# Patient Record
Sex: MALE | Race: WHITE | Employment: FULL TIME | URBAN - METROPOLITAN AREA
[De-identification: names, ages, dates, MRNs, and addresses within clinical notes are randomized per-mention and may not be internally consistent; named-entity substitution may affect disease eponyms.]

---

## 2021-12-20 ENCOUNTER — TELEPHONE (OUTPATIENT)
Dept: INTERNAL MEDICINE | Age: 41
End: 2021-12-20

## 2022-02-25 ENCOUNTER — TELEPHONE (OUTPATIENT)
Dept: INTERNAL MEDICINE | Age: 42
End: 2022-02-25

## 2022-04-28 ENCOUNTER — OFFICE VISIT (OUTPATIENT)
Dept: ORTHOPEDIC SURGERY | Age: 42
End: 2022-04-28
Payer: MEDICAID

## 2022-04-28 VITALS — WEIGHT: 274 LBS | HEIGHT: 75 IN | BODY MASS INDEX: 34.07 KG/M2

## 2022-04-28 DIAGNOSIS — G89.29 HIP PAIN, CHRONIC, RIGHT: ICD-10-CM

## 2022-04-28 DIAGNOSIS — E03.9 HYPOTHYROIDISM, UNSPECIFIED TYPE: ICD-10-CM

## 2022-04-28 DIAGNOSIS — M87.051 AVASCULAR NECROSIS OF BONE OF RIGHT HIP (HCC): Primary | ICD-10-CM

## 2022-04-28 DIAGNOSIS — F10.11 HISTORY OF ALCOHOL ABUSE: ICD-10-CM

## 2022-04-28 DIAGNOSIS — Z01.818 PREOP GENERAL PHYSICAL EXAM: ICD-10-CM

## 2022-04-28 DIAGNOSIS — M25.551 HIP PAIN, CHRONIC, RIGHT: ICD-10-CM

## 2022-04-28 PROCEDURE — 99204 OFFICE O/P NEW MOD 45 MIN: CPT | Performed by: ORTHOPAEDIC SURGERY

## 2022-04-28 RX ORDER — BACLOFEN 5 MG/1
10 TABLET ORAL 3 TIMES DAILY
COMMUNITY
Start: 2022-04-04

## 2022-04-28 RX ORDER — IBUPROFEN 800 MG/1
800 TABLET ORAL EVERY 8 HOURS PRN
COMMUNITY
Start: 2022-04-04 | End: 2022-07-16

## 2022-04-28 RX ORDER — DICLOFENAC SODIUM 75 MG/1
75 TABLET, DELAYED RELEASE ORAL 2 TIMES DAILY
Qty: 60 TABLET | Refills: 3 | Status: SHIPPED | OUTPATIENT
Start: 2022-04-28

## 2022-04-28 NOTE — PROGRESS NOTES
Chief Complaint:   Chief Complaint   Patient presents with    Hip Pain     Severe pain right hip / groin x 2 years. Rt sciatic pain. Pain with walking, feels right leg is shorter than left. No X-rays. Walks with cane       HPI 50-year-old man referred for consultation by Aria Meier [facility] for evaluation of chronic right groin pain. Symptoms for more than 2 years progressively disabling. States he has been treated by chiropractor with manipulation and concerned he may have had \"a groin tear\". He has been using 1 crutch. Told by a chiropractor that \"his hip is bone-on-bone\". Patient states that he recently checked himself in for alcohol rehab states he was a binge drinker and has been dry for 3 months. States he has had other no other known medical problems identified although there are no labs or other medical notes in our EHR. Patient Active Problem List   Diagnosis    Hypothyroid    History of alcohol abuse       Past Medical History:   Diagnosis Date    Thyroid disease        History reviewed. No pertinent surgical history. Current Outpatient Medications   Medication Sig Dispense Refill    Baclofen (LIORESAL) 5 MG tablet Take 5 mg by mouth 3 times daily       Cholecalciferol (VITAMIN D3) 125 MCG (5000 UT) CAPS Take 1 capsule by mouth daily       ibuprofen (ADVIL;MOTRIN) 800 MG tablet Take 800 mg by mouth every 8 hours as needed       diclofenac (VOLTAREN) 75 MG EC tablet Take 1 tablet by mouth 2 times daily 60 tablet 3    levothyroxine (SYNTHROID) 125 MCG tablet take 1 tablet by mouth once daily 30 tablet 5    naproxen (NAPROSYN) 500 MG tablet Take 1 tablet by mouth 2 times daily for 7 days. 14 tablet 0    cetirizine (ZYRTEC) 10 MG tablet Take 10 mg by mouth daily. (Patient not taking: Reported on 4/28/2022)       No current facility-administered medications for this visit.        No Known Allergies    Social History     Socioeconomic History    Marital status: Single     Spouse name: None    Number of children: None    Years of education: None    Highest education level: None   Occupational History    None   Tobacco Use    Smoking status: Never Smoker    Smokeless tobacco: Never Used   Substance and Sexual Activity    Alcohol use: Not Currently    Drug use: Never    Sexual activity: None   Other Topics Concern    None   Social History Narrative    ** Merged History Encounter **          Social Determinants of Health     Financial Resource Strain:     Difficulty of Paying Living Expenses: Not on file   Food Insecurity:     Worried About Running Out of Food in the Last Year: Not on file    Mirtha of Food in the Last Year: Not on file   Transportation Needs:     Lack of Transportation (Medical): Not on file    Lack of Transportation (Non-Medical): Not on file   Physical Activity:     Days of Exercise per Week: Not on file    Minutes of Exercise per Session: Not on file   Stress:     Feeling of Stress : Not on file   Social Connections:     Frequency of Communication with Friends and Family: Not on file    Frequency of Social Gatherings with Friends and Family: Not on file    Attends Tenri[employer] Services: Not on file    Active Member of [address] [place] [address] or Organizations: Not on file    Attends Club or Organization Meetings: Not on file    Marital Status: Not on file   Intimate Partner Violence:     Fear of Current or Ex-Partner: Not on file    Emotionally Abused: Not on file    Physically Abused: Not on file    Sexually Abused: Not on file   Housing Stability:     Unable to Pay for Housing in the Last Year: Not on file    Number of [place] in the Last Year: Not on file    Unstable Housing in the Last Year: Not on file       History reviewed. No pertinent family history. Review of Systems   No fever, chills, or other constitutionalsymptoms. No numbness or other neuro symptoms. No chest pain. No dyspnea.     [unfilled]   Ambulating with antalgic gait holding the right lower extremity flexed at the hip level. There is clinical shortening of the right lower extremity of nearly 1 inch although assessment limited by his hip flexion. There is severe restriction of right hip rotation in neutral which reproduces his pain. Distal motor exam right lower extremity appears intact. Physical Exam    Patient is alert and oriented. Affect appropriate. Well-developed well-nourished. Central obesity BMI 34. Pupils equal and reactive. Scleraeanicteric. Neck supple  Lungs clear. Cardiac rate and rhythm regular. Abdomen soft and nontender. Skin warm and dry. XRAY: X-ray today AP pelvis with AP and lateral views right hip. Right femoral head shows mixed sclerosis and lytic changes with collapse of the superior segment with secondary severe arthrosis and joint space loss. Left hip is well-preserved. Impression: Findings of osteonecrosis right femoral head with secondary severe arthropathy. Screening x-rays lumbar spine AP and lateral views today. There are some posterior element degenerative changes but disc space and vertebral heights well-preserved no spondylolisthesis. Impression: Mild lumbar spondylosis. ASSESSMENT/PLAN:    Yossi Chapman was seen today for hip pain. Diagnoses and all orders for this visit:    Avascular necrosis of bone of right hip ([facility] [address]  -     Ambulatory referral to Physical Therapy  -     Cane adjustable single point    Hip pain, chronic, right  -     XR HIP 2-3 VW W PELVIS RIGHT; Future  -     XR LUMBAR SPINE (2-3 VIEWS); Future  -     Graylin Epley, MD, Primary Care, [place]    History of alcohol abuse    Hypothyroidism, unspecified type    Preop general physical exam    Other orders  -     diclofenac (VOLTAREN) 75 MG EC tablet; Take 1 tablet by mouth 2 times daily    Findings and images reviewed with this patient. Clearly has history physical findings and x-ray findings of avascular necrosis right hip.   Treatment will require total hip arthroplasty. Patient wishes to try to work at his new job for at least a couple of months before he takes any time off for surgery. He also needs to properly establish with primary care for thorough medical evaluation and to be ready for follow-up when he is ready to have his surgery. He is referred to [facility] primary care for thorough medical evaluation. He would like to see PT to see what they can do to help him in the meantime. He has been taking ibuprofen without relief. May benefit from switching to a trial of Voltaren 75 mg twice daily with meals and GI precautions. He will follow-up after the above with Dr. Bard Ponce for further discussion of potential right total hip arthroplasty. Return in about 6 weeks (around 6/9/2022) for Dr. Bard Ponce, total hip arthroplasty.        Jessica Thomas MD    4/28/2022  12:49 PM

## 2022-05-04 ENCOUNTER — EVALUATION (OUTPATIENT)
Dept: PHYSICAL THERAPY | Age: 42
End: 2022-05-04
Payer: MEDICAID

## 2022-05-04 DIAGNOSIS — M87.051 IDIOPATHIC ASEPTIC NECROSIS OF RIGHT FEMUR (HCC): Primary | ICD-10-CM

## 2022-05-04 PROCEDURE — 97161 PT EVAL LOW COMPLEX 20 MIN: CPT | Performed by: PHYSICAL THERAPIST

## 2022-05-04 PROCEDURE — 97112 NEUROMUSCULAR REEDUCATION: CPT | Performed by: PHYSICAL THERAPIST

## 2022-05-04 PROCEDURE — 97110 THERAPEUTIC EXERCISES: CPT | Performed by: PHYSICAL THERAPIST

## 2022-05-05 NOTE — PROGRESS NOTES
[address] [facility]   Phone: [phone]   Fax: [phone]      Physical Therapy Daily Treatment Note    Date: 2022  Patient Name: Hyland Kussmaul  : 1980   MRN: [id number]  DOInjury: Chronic 2yrs   DOSx: NA  Referring Provider: Elina Fan MD  [address] [place] [address]     Medical Diagnosis:     M87.051 (ICD-10-CM) - Avascular necrosis of bone of right hip ([address]    Outcome Measure: LEFS 65% Disability    S: Pt reports that he has been referred to Dr. Veronika Romero for RLE LUTHER    O: Please refer to PT Eval  Time 9507-2583     Visit  Repeat outcome measure at mid point and end. Pain      Strength      Palpation      ROM      Modalities            Manual            Stretch      IT band supine      HS supine      Quad Prone      Exercise      Seated & supine ball squeeze hip add x20 each  TE   Quad sets x20  TE   glute bridge x10  NR   glute bridge with hip add x10 Ball squeeze  NR   glute bridge with hip abd x10 orange NR   LAQ x1 1min  NR   Seated & supine clamshell x20 orange NR                                       Resisted walk      FWD      BKWD      lat      March      Side stepping      Retro walk      Heel to toe      A:  Tolerated well. Above added to written HEP.     P: Continue with rehab plan    Stephanie Alarcon, PT OHPT    Treatment Charges: Mins Units   Initial Evaluation 20 1   Re-Evaluation     Ther Exercise         TE 10 1   Manual Therapy     MT     Ther Activities        TA     Gait Training          GT     Neuro Re-education NR 10 1   Modalities     Non-Billable Service Time     Other     Total Time/Units 40 3

## 2022-05-05 NOTE — PROGRESS NOTES
[address] [facility]   Phone: [phone]   Fax: [phone]         Date:  2022   Patient: Maynor Turpin  : 1980  MRN: [id number]  Referring Provider: Natan Lamb MD  [address] [place] [address]     Medical Diagnosis:     M87.051 (ICD-10-CM) - Avascular necrosis of bone of right hip ([facility] [address]     SUBJECTIVE:     Onset date: Chronic 2yrs     Onset: Insidious onset    Mechanism of Injury: Chronic right hip pain over 2yrs. Previous PT: none     Medical Management for Current Problem: referred to Dr. Rom Putnam for RLE [address]    Chief complaint: pain, decreased motion, decreased mobility, weakness, inability / limited ability to use leg, difficulty walking, difficulty with stairs    Behavior: condition is staying the same    Pain: constant  Current: 7/10     Best: 6/10     Worst:9/10    Symptom Type/Quality: sharp, throbbing, shooting, piercing  Location[de-identified] Hip: gross & diffuse right hip pain     Aggravated by: walking, standing, stairs, getting in/out of car, pivoting    Relieved by: nothing    Imaging results: XR LUMBAR SPINE (2-3 VIEWS)    Result Date: 2022  Radiology exam is complete. No Radiologist dictation. Please follow up with ordering provider. XR HIP 2-3 VW W PELVIS RIGHT    Result Date: 2022  Radiology exam is complete. No Radiologist dictation. Please follow up with ordering provider. Past Medical History:  Past Medical History:   Diagnosis Date    Thyroid disease      No past surgical history on file.     Medications:   Current Outpatient Medications   Medication Sig Dispense Refill    Baclofen (LIORESAL) 5 MG tablet Take 5 mg by mouth 3 times daily       Cholecalciferol (VITAMIN D3) 125 MCG (5000 UT) CAPS Take 1 capsule by mouth daily       ibuprofen (ADVIL;MOTRIN) 800 MG tablet Take 800 mg by mouth every 8 hours as needed       diclofenac (VOLTAREN) 75 MG EC tablet Take 1 tablet by mouth 2 times daily 60 tablet 3    levothyroxine (SYNTHROID) 125 MCG tablet take 1 tablet by mouth once daily 30 tablet 5    naproxen (NAPROSYN) 500 MG tablet Take 1 tablet by mouth 2 times daily for 7 days. 14 tablet 0    cetirizine (ZYRTEC) 10 MG tablet Take 10 mg by mouth daily. (Patient not taking: Reported on 4/28/2022)       No current facility-administered medications for this visit. 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 Exercise regimen: none    Patient Goals: improve strength & mobility prior to RLE LUTHER    Precautions/Contraindications: none    OBJECTIVE:     Observations: well nourished male    Inspection: demonstrates generalized pronated posture (forward head, protracted scapula, internally rotated shoulders, and flexed trunk and lower extremities)    Gait: antalgic gait, limp R LE, QC    Joint/Motion:    Hip:  Right:   AROM: severely limited      Muscle Length Testing: restricted flexibility hip flexors, IT Band, hamstrings, & quadriceps. Strength:    Hip:   Right: Hip: 3-/5, Knee: 4/5      Special test comments: NA due to pt's diagnosis       ASSESSMENT     Outcome Measure: Lower Extremity Functional Scale (LEFS) 65% impairment    Problems:    Pain reported 9/10   ROM decreased    Strength decreased    Decreased functional ability with walking, stairs, standing, use of right lower extremity    Reason for Skilled Care: The pt presents with severe right hip pain, gait deviations, functional limitations, limited rom, weakness, & restricted flexibility due to pt's diagnosis. Therefore, I recommend that the requires the skilled services of outpt PT Rehab to achieve LTGs, restore & resolve her deficits & limitations, & facilitate return to prior level of function/activity. [x] There are no barriers affecting plan of care or recovery     [] Barriers to this patient's plan of care or recovery include.     Domestic Concerns:  [x] No  [] Yes:    Short Term goals (3 weeks)   Decrease reported pain to 6/10   Increase ROM to 50%   Increase Strength to 4/5    Able to perform/complete the following functions/tasks: household AMB with minimal gait deviations   Lower Extremity Functional Scale (LEFS) 50% impairment    Long Term goals (6 weeks)   Decrease reported pain to 5/10   Increase ROM to 75%   Increase Strength to 4+/5    Able to perform/complete the following functions/tasks: community AMB with minimal gait deviations   LEFS 40% impairment    Independent with Home Exercise Programs    Rehab Potential: [x] Good  [] Fair  [] Poor    PLAN       Treatment Plan: 2x/wk x 6wks  [x] Therapeutic Exercise  [x] Therapeutic Activity  [x] Neuromuscular Re-education   [x] Gait Training  [x] Balance Training  [x] Aerobic conditioning  [] Manual Therapy  [] Massage/Fascial release   [] Work/Sport specific activities    [] Pain Neuroscience [x] Cold/hotpack  [] Vasocompression  [] Electrical Stimulation  [] Lumbar/Cervical Traction  [] Ultrasound   [] Iontophoresis: 4 mg/mL Dexamethasone Sodium Phosphate 40-80 mAmin  [] Dry Needling      [x] Instruction in HEP      []  Medication allergies reviewed for use of Dexamethasone Sodium Phosphate 4mg/ml  with iontophoresis treatments. Patient is not allergic. The following CPT codes are likely to be used in the care of this patient: 02354 PT Evaluation: Low Complexity   56572 Therapeutic Exercise   83695 Neuromuscular Re-Education   96392 Therapeutic Activities   55547 Gait Training       Suggested Professional Referral: [x] No  [] Yes:     Patient Education:  [x] Plans/Goals, Risks/Benefits discussed  [x] Home exercise program  Method of Education: [x] Verbal  [x] Demo  [x] Written  Comprehension of Education:  [x] Verbalizes understanding. [x] Demonstrates understanding. [] Needs Review. [] Demonstrates/verbalizes understanding of HEP/Ed previously given.     Frequency: 2 days per week for 6 weeks    Patient understands diagnosis/prognosis and consents to treatment, plan and goals: [x] Yes    [] No     Thank you for the opportunity to work with your patient. If you have questions or comments, please contact me at numbers listed above. Electronically signed by: Lloyd Reyes, [address]         Please sign Physician's Certification and return to: [address] [place] [id number]  Dept: [phone]  Dept Fax: [phone]  Loc: [phone] Certification / Comments     Frequency/Duration 2 days per week for 6 weeks. Certification period from 5/4/2022  to 7/4/2022. I have reviewed the Plan of Care established for skilled therapy services and certify that the services are required and that they will be provided while the patient is under my care.     Physician's Comments/Revisions:               Physician's Printed Name:                                           [de-identified] Signature:                                                               Date:

## 2022-06-06 ENCOUNTER — OFFICE VISIT (OUTPATIENT)
Dept: ORTHOPEDIC SURGERY | Age: 42
End: 2022-06-06

## 2022-06-06 VITALS — HEIGHT: 75 IN | WEIGHT: 271.4 LBS | BODY MASS INDEX: 33.74 KG/M2

## 2022-06-06 DIAGNOSIS — G89.29 HIP PAIN, CHRONIC, RIGHT: ICD-10-CM

## 2022-06-06 DIAGNOSIS — M25.551 HIP PAIN, CHRONIC, RIGHT: ICD-10-CM

## 2022-06-06 DIAGNOSIS — M87.051 AVASCULAR NECROSIS OF BONE OF RIGHT HIP (HCC): Primary | ICD-10-CM

## 2022-06-06 PROCEDURE — 99214 OFFICE O/P EST MOD 30 MIN: CPT | Performed by: ORTHOPAEDIC SURGERY

## 2022-06-06 RX ORDER — RESVER/WINE/BFL/GRPSD/PC/C/POM 200MG-60MG
CAPSULE ORAL
COMMUNITY
Start: 2022-05-26

## 2022-06-06 NOTE — PROGRESS NOTES
New Hip Patient     Referring Provider:   No referring provider defined for this encounter. CHIEF COMPLAINT:   Chief Complaint   Patient presents with    Hip Pain     RJB patient - Avascular necrosis of bone of right hip - last seen in office on 4/28/2022 - PT script was provided - Pt wants to try to work at his new job for at least a couple of months before he takes any time off for surgery - patient is using a cane to ambulate - laying down is painful / does not sleep well - getting up from seated position     Surgical Consult     Rt LUTHER        HPI:    Shanae Reece is a 36y.o. year old male who is seen today  for evaluation of right hip pain. He reports the pain has been ongoing for the past 2 years. He   does not recall a specific injury which started the pain. Patient was previously seen by Dr. Irwin Carter whom referred patient to our clinic for evaluation. Patient was told based on the significance of his imaging, a total hip arthroplasty was the only definitive solution for his pain. He reports the pain is worse with movement, better with nothing. The patient does have mechanical symptoms. The prior treatments have been oral anti-inflammatory medication. The patient is working. The patients occupation  at [employer]. He states the pain and limited mobility have significantly affected his daily activity to the point where he can no longer function satisfactorily. Patient walks with a cane. He had been seen by a chiropractor in the past and attempted therapy without relief. He also notes a significant leg length discrepancy. Patient also notes a prior alcoholism addiction for which he has successfully addressed. No known prior injury to the right hip. PAST MEDICAL HISTORY  Past Medical History:   Diagnosis Date    Thyroid disease        PAST SURGICAL HISTORY  History reviewed. No pertinent surgical history. FAMILY HISTORY   History reviewed. No pertinent family history.     SOCIAL HISTORY  Social History     Occupational History    Not on file   Tobacco Use    Smoking status: Never Smoker    Smokeless tobacco: Never Used   Substance and Sexual Activity    Alcohol use: Not Currently    Drug use: Never    Sexual activity: Not on file       CURRENT MEDICATIONS     Current Outpatient Medications:     D-5000 125 MCG (5000 UT) TABS tablet, TAKE 1 TABLET BY MOUTH EVERY DAY, Disp: , Rfl:     Baclofen (LIORESAL) 5 MG tablet, Take 5 mg by mouth 3 times daily , Disp: , Rfl:     ibuprofen (ADVIL;MOTRIN) 800 MG tablet, Take 800 mg by mouth every 8 hours as needed , Disp: , Rfl:     levothyroxine (SYNTHROID) 125 MCG tablet, take 1 tablet by mouth once daily, Disp: 30 tablet, Rfl: 5    diclofenac (VOLTAREN) 75 MG EC tablet, Take 1 tablet by mouth 2 times daily (Patient not taking: Reported on 6/6/2022), Disp: 60 tablet, Rfl: 3    cetirizine (ZYRTEC) 10 MG tablet, Take 10 mg by mouth daily. (Patient not taking: Reported on 4/28/2022), Disp: , Rfl:     ALLERGIES  No Known Allergies    Controlled Substances Monitoring:      REVIEW OF SYSTEMS:     Constitutional:  Negative for weight loss, fevers, chills, fatigue  Cardiovascular: Negative for chest pain, palpitations  Pulmonary: Negative for shortness of breath, labored breathing, cough  GI: negative for abdominal pain, nausea, vomitting   MSK: per HPI  Skin: negative for rash, open wounds    All other systems reviewed and are negative           PHYSICAL EXAM     Vitals:    06/06/22 1456   Weight: 271 lb 6.4 oz (123.1 kg)   Height: 6' 3\" (1.905 m)       Height: 6' 3\" (1.905 m)  Weight: [unfilled]  BMI:  Body mass index is 33.92 kg/m². General: The patient is alert and oriented x 3, appears to be stated age and in no distress. HEENT: head is normocephalic, atraumatic. EOMI. Neck: supple, trachea midline, no thyromegaly   Cardiovascular: peripheral pulses palpable.   Normal Capillary refill   Respiratory: breathing unlabored, chest expansion symmetric   Skin: no rash, no open wounds, no erythema  Psych: normal affect; mood stable  Neurologic: gait normal, sensation grossly intact in extremities  MSK:     Hip:  Gait exam is antalgic. The pelvis is asymmetic. In the supine position, there is pain with log roll. Hip range of motion is  120 degrees flexion, and 30 degrees abduction. With the hip flexed 90 degrees, internal rotation and external rotation is limited. FADIR (flexion, adduction, internal rotation) testing is positive. SOFI (flexion, abduction, external rotation) testing is negative. Patient does have significant shortening of the right leg in comparison to the left leg- approximately 4 cm LLD. IMAGING:    XR: AP pelvis, AP and Lateral of the involved hip obtained from 4/28/22 reviewed with patient today demonstrating osteo necrosis of the femoral head with collapse into the superolateral acetabulum and erosive changes present about the lateral rim of the acetabulum. ASSESSMENT  Avascular necrosis of the femoral head with severe bony erosion of the femoral head and acetabulum    PLAN  Patient was seen and examined in office today. We reviewed patient's history, exam, and imaging in detail. Patient aware that his right hip has significant bony erosion and loss of normal joint shape and function which would make any conservative treatment unlikely to be effective at this point. Patient voiced that his symptoms are severe enough that he would like to proceed with total hip arthroplasty at this time. We will plan on utilizing robotic assistance for the total hip arthroplasty procedure. We also discussed his leg length discrepancy and intentions of correcting to an extent that would not compromise motor function. We will have patient obtain CT right hip for surgical planning purposes and schedule patient for right total hip arthroplasty accordingly.     Ulises Sanchez DO  Orthopedic Surgery Resident  PGY-3    Staff Addendum    I have seen and evaluated the patient and agree with the assessment and plan as documented by the orthopaedic surgery resident. I have performed the key components of the history and physical examination and concur with the findings and plan, and have made changes where appropriate/necessary. Agree with above. He has impressive destruction of his right hip joint secondary to avascular necrosis. Any treatment aside from total hip replacement unlikely to provide any type of relief at this point given the extent of his disease. He is also about 4 cm shorter on the right side. He is interested in proceeding with surgery    We discussed the surgical procedure today which will involve Maximiliano robot assisted total hip replacement. Risks of surgery were discussed in detail including but not limited to infection, neurovascular injury, leg length discrepancy, dislocation, need for revision surgery, death from anesthesia, unforeseen risks. He understands and would like to proceed.   Surgical involve right hip Maximiliano robot assisted total hip replacement      Amber Hinojosa MD  [address]

## 2022-06-06 NOTE — PATIENT INSTRUCTIONS
Surgery: RIGHT HIP ANA ROBOTIC ASSISTED TOTAL JOINT ARTHROPLASTY  Date: 7/7/2022  Location: [facility] a call from the office once on the surgery schedule within the next 1-2 days. If you have any questions, please call [phone] and ask for Monica Jean-Baptiste.     -Schedule your CT scan - [phone]       -Read over your patient handout on what to expect about your upcoming surgery.     -You will receive a phone call from the hospital within week of your date of surgery regarding instructions and arrival time.

## 2022-06-06 NOTE — PROGRESS NOTES
New Hip Patient     Referring Provider:   Anna Marie Hollis Patient     CHIEF COMPLAINT:   Chief Complaint   Patient presents with    Hip Pain     [facility] patient - Avascular necrosis of bone of right hip - last seen in office on 4/28/2022 - PT script was provided - Pt wants to try to work at his new job for at least a couple of months before he takes any time off for surgery - patient is using a cane to ambulate - laying down is painful / does not sleep well - getting up from seated position     Surgical Consult     Rt LUTHER        HPI:    Armond Riedel is a 36y.o. year old male who is seen today  for evaluation of right hip pain. He reports the pain has been ongoing for the past {NUMBERS 1-10:43466} {days/wks/mos/yrs:253325}. He   {OBNM:42735} recall a specific injury which started the pain.  ***. He  reports the pain is worse with ***, better with ***. The patient {DOES/DOES NOT:68041} have mechanical symptoms. He denies a feeling of instability. The prior treatments have been {prev rx:815098}. The patient   {HAS/HAS NOT:881760668} responded to the treatment. The patient is working. The patients occupation . ***    PAST MEDICAL HISTORY  Past Medical History:   Diagnosis Date    Thyroid disease        PAST SURGICAL HISTORY  History reviewed. No pertinent surgical history. FAMILY HISTORY   History reviewed. No pertinent family history.     SOCIAL HISTORY  Social History     Occupational History    Not on file   Tobacco Use    Smoking status: Never Smoker    Smokeless tobacco: Never Used   Substance and Sexual Activity    Alcohol use: Not Currently    Drug use: Never    Sexual activity: Not on file       CURRENT MEDICATIONS     Current Outpatient Medications:     D-5000 125 MCG (5000 UT) TABS tablet, TAKE 1 TABLET BY MOUTH EVERY DAY, Disp: , Rfl:     Baclofen (LIORESAL) 5 MG tablet, Take 5 mg by mouth 3 times daily , Disp: , Rfl:     ibuprofen (ADVIL;MOTRIN) 800 MG tablet, Take 800 mg by mouth every 8 hours as needed , Disp: , Rfl:     levothyroxine (SYNTHROID) 125 MCG tablet, take 1 tablet by mouth once daily, Disp: 30 tablet, Rfl: 5    diclofenac (VOLTAREN) 75 MG EC tablet, Take 1 tablet by mouth 2 times daily (Patient not taking: Reported on 6/6/2022), Disp: 60 tablet, Rfl: 3    cetirizine (ZYRTEC) 10 MG tablet, Take 10 mg by mouth daily. (Patient not taking: Reported on 4/28/2022), Disp: , Rfl:     ALLERGIES  No Known Allergies    Controlled Substances Monitoring:      REVIEW OF SYSTEMS:     Constitutional:  Negative for weight loss, fevers, chills, fatigue  Cardiovascular: Negative for chest pain, palpitations  Pulmonary: Negative for shortness of breath, labored breathing, cough  GI: negative for abdominal pain, nausea, vomitting   MSK: per HPI  Skin: negative for rash, open wounds    All other systems reviewed and are negative           PHYSICAL EXAM     Vitals:    06/06/22 1456   Weight: 271 lb 6.4 oz (123.1 kg)   Height: 6' 3\" (1.905 m)       Height: 6' 3\" (1.905 m)  Weight: 271.4 lb ACTUAL  BMI:  Body mass index is 33.92 kg/m². General: The patient is alert and oriented x 3, appears to be stated age and in no distress. HEENT: head is normocephalic, atraumatic. EOMI. Neck: supple, trachea midline, no thyromegaly   Cardiovascular: peripheral pulses palpable.   Normal Capillary refill   Respiratory: breathing unlabored, chest expansion symmetric   Skin: no rash, no open wounds, no erythema  Psych: normal affect; mood stable  Neurologic: gait normal, sensation grossly intact in extremities  MSK:     Hip:  ***     IMAGING:    XR: AP pelvis, AP and Lateral of the involved hip display ***       ASSESSMENT  Right hip     PLAN  ***        Dariel Capps MD  Orthopaedic Surgery   6/6/22  3:00 PM

## 2022-06-07 ENCOUNTER — TELEPHONE (OUTPATIENT)
Dept: ORTHOPEDIC SURGERY | Age: 42
End: 2022-06-07

## 2022-06-07 NOTE — TELEPHONE ENCOUNTER
TOTAL JOINT ARTHROPLASTY  R LUTHER ANA ASSISTED      Reviewed medications with patient / holding the following medications for surgery: all OTC anti-inflammatories, vitamins and muscle relaxer. He does not require medical clearance. Patient educated on weight loss and diet. Current BMI: 33.92 Dietary Handout: Patient Education Guide Regarding Iron Replacement given to and reviewed with patient. Patient instructed to begin eating foods rich in Iron and Vitamin C one month pre op and continue for one month post op. Pre op instructions and total joint folder given to the patient. Patient informed: A hospital nurse from pre-admission testing will contact the patient with a date for testing and mandatory joint class. Patient expresses understanding and is in agreement with the plan. After review of the pre op information at home; patient will call office with any questions, concerns or problems. Patient also informed: Radiology from [facility] will contact regarding a date and time CT scan which is required for robot assisted surgery. Post Op Appointment: 7/15/2022       CT ordered 6/6, radiology number provided.     Case added to  for scheduling

## 2022-06-19 ASSESSMENT — PROMIS GLOBAL HEALTH SCALE
WHO IS THE PERSON COMPLETING THE PROMIS V1.1 SURVEY?: 0
IN GENERAL, PLEASE RATE HOW WELL YOU CARRY OUT YOUR USUAL SOCIAL ACTIVITIES (INCLUDES ACTIVITIES AT HOME, AT WORK, AND IN YOUR COMMUNITY, AND RESPONSIBILITIES AS A PARENT, CHILD, SPOUSE, EMPLOYEE, FRIEND, ETC) [ON A SCALE OF 1 (POOR) TO 5 (EXCELLENT)]?: 4
IN GENERAL, WOULD YOU SAY YOUR HEALTH IS...[ON A SCALE OF 1 (POOR) TO 5 (EXCELLENT)]: 3
IN GENERAL, WOULD YOU SAY YOUR QUALITY OF LIFE IS...[ON A SCALE OF 1 (POOR) TO 5 (EXCELLENT)]: 4
IN THE PAST 7 DAYS, HOW WOULD YOU RATE YOUR FATIGUE ON AVERAGE [ON A SCALE FROM 1 (NONE) TO 5 (VERY SEVERE)]?: 3
TO WHAT EXTENT ARE YOU ABLE TO CARRY OUT YOUR EVERYDAY PHYSICAL ACTIVITIES SUCH AS WALKING, CLIMBING STAIRS, CARRYING GROCERIES, OR MOVING A CHAIR [ON A SCALE OF 1 (NOT AT ALL) TO 5 (COMPLETELY)]?: 3
IN THE PAST 7 DAYS, HOW OFTEN HAVE YOU BEEN BOTHERED BY EMOTIONAL PROBLEMS, SUCH AS FEELING ANXIOUS, DEPRESSED, OR IRRITABLE [ON A SCALE FROM 1 (NEVER) TO 5 (ALWAYS)]?: 1
IN GENERAL, HOW WOULD YOU RATE YOUR MENTAL HEALTH, INCLUDING YOUR MOOD AND YOUR ABILITY TO THINK [ON A SCALE OF 1 (POOR) TO 5 (EXCELLENT)]?: 4
HOW IS THE PROMIS V1.1 BEING ADMINISTERED?: 2
SUM OF RESPONSES TO QUESTIONS 3, 6, 7, & 8: 13
SUM OF RESPONSES TO QUESTIONS 2, 4, 5, & 10: 13
IN GENERAL, HOW WOULD YOU RATE YOUR SATISFACTION WITH YOUR SOCIAL ACTIVITIES AND RELATIONSHIPS [ON A SCALE OF 1 (POOR) TO 5 (EXCELLENT)]?: 4
IN GENERAL, HOW WOULD YOU RATE YOUR PHYSICAL HEALTH [ON A SCALE OF 1 (POOR) TO 5 (EXCELLENT)]?: 2
IN THE PAST 7 DAYS, HOW WOULD YOU RATE YOUR PAIN ON AVERAGE [ON A SCALE FROM 0 (NO PAIN) TO 10 (WORST IMAGINABLE PAIN)]?: 5

## 2022-06-19 ASSESSMENT — HOOS JR
HOOS JR TOTAL INTERVAL SCORE: 32.735
HOOS JR RAW SCORE: 18
HOOS JR RAW SCORE: 18
BENDING TO THE FLOOR TO PICK UP OBJECT: 2
RISING FROM SITTING: 3
WALKING ON UNEVEN SURFACE: 4
GOING UP OR DOWN STAIRS: 3
SITTING: 3
LYING IN BED (TURNING OVER, MAINTAINING HIP POSITION): 3

## 2022-06-20 ENCOUNTER — TELEPHONE (OUTPATIENT)
Dept: ORTHOPEDIC SURGERY | Age: 42
End: 2022-06-20

## 2022-06-20 ENCOUNTER — HOSPITAL ENCOUNTER (OUTPATIENT)
Dept: CT IMAGING | Age: 42
Discharge: HOME | End: 2022-06-22
Payer: MEDICAID

## 2022-06-20 DIAGNOSIS — M87.051 AVASCULAR NECROSIS OF BONE OF RIGHT HIP (HCC): ICD-10-CM

## 2022-06-20 PROCEDURE — 73700 CT LOWER EXTREMITY W/O DYE: CPT

## 2022-06-23 ENCOUNTER — TELEPHONE (OUTPATIENT)
Dept: ORTHOPEDIC SURGERY | Age: 42
End: 2022-06-23

## 2022-06-23 NOTE — TELEPHONE ENCOUNTER
Patient called office, he states he has not been scheduled for his joint class / PAT. Instructed / informed we do not set up the classes.     Surgery :      Patient was provided with Fariba's phone number : [phone]     Patient will reach out to her for scheduling

## 2022-06-27 ENCOUNTER — HOSPITAL ENCOUNTER (OUTPATIENT)
Dept: PREADMISSION TESTING | Age: 42
Discharge: HOME | End: 2022-06-27
Payer: MEDICAID

## 2022-06-27 VITALS
TEMPERATURE: 98.3 F | HEART RATE: 76 BPM | WEIGHT: 282 LBS | OXYGEN SATURATION: 96 % | HEIGHT: 75 IN | DIASTOLIC BLOOD PRESSURE: 77 MMHG | SYSTOLIC BLOOD PRESSURE: 130 MMHG | RESPIRATION RATE: 16 BRPM | BODY MASS INDEX: 35.06 KG/M2

## 2022-06-27 LAB
ANION GAP SERPL CALCULATED.3IONS-SCNC: 14 MMOL/L (ref 7–16)
BUN BLDV-MCNC: 12 MG/DL (ref 6–20)
CALCIUM SERPL-MCNC: 10 MG/DL (ref 8.6–10.2)
CHLORIDE BLD-SCNC: 102 MMOL/L (ref 98–107)
CO2: 22 MMOL/L (ref 22–29)
CREAT SERPL-MCNC: 0.7 MG/DL (ref 0.7–1.2)
EKG ATRIAL RATE: 74 BPM
EKG P AXIS: 35 DEGREES
EKG P-R INTERVAL: 190 MS
EKG Q-T INTERVAL: 388 MS
EKG QRS DURATION: 92 MS
EKG QTC CALCULATION (BAZETT): 430 MS
EKG R AXIS: 16 DEGREES
EKG T AXIS: 21 DEGREES
EKG VENTRICULAR RATE: 74 BPM
GFR AFRICAN AMERICAN: >60
GFR NON-AFRICAN AMERICAN: >60 ML/MIN/1.73
GLUCOSE BLD-MCNC: 101 MG/DL (ref 74–99)
HCT VFR BLD CALC: 41.8 % (ref 37–54)
HEMOGLOBIN: 14.1 G/DL (ref 12.5–16.5)
MCH RBC QN AUTO: 30.8 PG (ref 26–35)
MCHC RBC AUTO-ENTMCNC: 33.7 % (ref 32–34.5)
MCV RBC AUTO: 91.3 FL (ref 80–99.9)
PDW BLD-RTO: 12.5 FL (ref 11.5–15)
PLATELET # BLD: 346 E9/L (ref 130–450)
PMV BLD AUTO: 8.8 FL (ref 7–12)
POTASSIUM SERPL-SCNC: 4.5 MMOL/L (ref 3.5–5)
RBC # BLD: 4.58 E12/L (ref 3.8–5.8)
SODIUM BLD-SCNC: 138 MMOL/L (ref 132–146)
WBC # BLD: 8.4 E9/L (ref 4.5–11.5)

## 2022-06-27 PROCEDURE — 93005 ELECTROCARDIOGRAM TRACING: CPT

## 2022-06-27 PROCEDURE — 80048 BASIC METABOLIC PNL TOTAL CA: CPT

## 2022-06-27 PROCEDURE — 85027 COMPLETE CBC AUTOMATED: CPT

## 2022-06-27 PROCEDURE — 87081 CULTURE SCREEN ONLY: CPT

## 2022-06-27 PROCEDURE — 36415 COLL VENOUS BLD VENIPUNCTURE: CPT

## 2022-06-27 RX ORDER — M-VIT,TX,IRON,MINS/CALC/FOLIC 27MG-0.4MG
1 TABLET ORAL DAILY
COMMUNITY

## 2022-06-27 ASSESSMENT — HOOS JR
GOING UP OR DOWN STAIRS: 3
LYING IN BED (TURNING OVER, MAINTAINING HIP POSITION): 3
RISING FROM SITTING: 3
HOOS JR RAW SCORE: 17
HOOS JR RAW SCORE: 17
BENDING TO THE FLOOR TO PICK UP OBJECT: 2
WALKING ON UNEVEN SURFACE: 3
HOOS JR TOTAL INTERVAL SCORE: 36.363
SITTING: 3

## 2022-06-27 NOTE — PROGRESS NOTES
I met with Linda Hahn this am for an orthopaedic education class. We discussed information regarding pre, intra, post-op, discharge, and LOS expectations. I provided ortho education materials. I encouraged the patient to call with any questions or concerns.

## 2022-06-27 NOTE — PROGRESS NOTES
[facility] PRE-ADMISSION TESTING INSTRUCTIONS    The Preadmission Testing patient is instructed accordingly using the following criteria (check applicable):    ARRIVAL INSTRUCTIONS:  [x] Parking the day of Surgery is located in the Main Entrance lot. Upon entering the door, make an immediate right to the surgery reception desk    [x] Bring photo ID and insurance card    [] Bring in a copy of Living will or Durable Power of  papers. [x] Please be sure to arrange for responsible adult to provide transportation to and from the hospital    [] Please arrange for responsible adult to be with you for the 24 hour period post procedure due to having anesthesia    [x] If you awake am of surgery not feeling well or have temperature >100 please call [phone]    GENERAL INSTRUCTIONS:    [x] Nothing by mouth after midnight, including gum, candy, mints or water    [x] You may brush your teeth, but do not swallow any water    [] Take medications as instructed with 1-2 oz of water    [x] Stop herbal supplements and vitamins 5 days prior to procedure    [] Follow preop dosing of blood thinners per physician instructions    [] Take 1/2 dose of evening insulin, but no insulin after midnight    [] No oral diabetic medications after midnight    [] If diabetic and have low blood sugar or feel symptomatic, take 1-2oz apple juice only    [] Bring inhalers day of surgery    [] Bring C-PAP/ Bi-Pap day of surgery    [] Bring urine specimen day of surgery    [] Shower or bath with soap, lather and rinse well, AM of Surgery, no lotion, powders or creams to surgical site    [] Follow bowel prep as instructed per surgeon    [x] No tobacco products within 24 hours of surgery     [x] No alcohol or illegal drug use within 24 hours of surgery.     [x] Jewelry, body piercing's, eyeglasses, contact lenses and dentures are not permitted into surgery (bring cases)      [x] Please do not wear any nail polish, make up or hair products on the day of surgery    [x] You can expect a call the business day prior to procedure to notify you if your arrival time changes    [x] If you receive a survey after surgery we would greatly appreciate your comments    [x] Please notify surgeon if you develop any illness between now and time of surgery (cold, cough, sore throat, fever, nausea, vomiting) or any signs of infections  including skin, wounds, and dental.    [x]  The Outpatient Pharmacy is available to fill your prescription here on your day of surgery, ask your preop nurse for details    [] Other instructions    EDUCATIONAL MATERIALS PROVIDED:    [x] PAT Preoperative Education Packet/Booklet     [x] Medication List    [] Transfusion bracelet applied with instructions    [x] Shower with soap, lather and rinse well, and use CHG wipes provided the evening before surgery as instructed    [x] Incentive spirometer with instructions

## 2022-06-29 LAB — MRSA CULTURE ONLY: NORMAL

## 2022-07-07 ENCOUNTER — HOSPITAL ENCOUNTER (OUTPATIENT)
Age: 42
Setting detail: OBSERVATION
Discharge: HOME | End: 2022-07-07
Attending: ORTHOPAEDIC SURGERY | Admitting: ORTHOPAEDIC SURGERY
Payer: MEDICAID

## 2022-07-07 ENCOUNTER — TELEPHONE (OUTPATIENT)
Dept: ORTHOPEDIC SURGERY | Age: 42
End: 2022-07-07

## 2022-07-07 VITALS
RESPIRATION RATE: 16 BRPM | WEIGHT: 282 LBS | DIASTOLIC BLOOD PRESSURE: 80 MMHG | TEMPERATURE: 98.6 F | BODY MASS INDEX: 35.06 KG/M2 | HEIGHT: 75 IN | OXYGEN SATURATION: 99 % | SYSTOLIC BLOOD PRESSURE: 138 MMHG | HEART RATE: 80 BPM

## 2022-07-07 DIAGNOSIS — Z01.818 PREOP GENERAL PHYSICAL EXAM: Primary | ICD-10-CM

## 2022-07-07 DIAGNOSIS — Z01.818 PRE-OP TESTING: Primary | ICD-10-CM

## 2022-07-07 PROBLEM — Z96.641 STATUS POST TOTAL HIP REPLACEMENT, RIGHT: Status: ACTIVE | Noted: 2022-07-07

## 2022-07-07 LAB — PREALBUMIN: 28 MG/DL (ref 20–40)

## 2022-07-07 PROCEDURE — 84134 ASSAY OF PREALBUMIN: CPT

## 2022-07-07 PROCEDURE — 36415 COLL VENOUS BLD VENIPUNCTURE: CPT

## 2022-07-07 RX ORDER — SODIUM CHLORIDE 0.9 % (FLUSH) 0.9 %
5-40 SYRINGE (ML) INJECTION PRN
Status: DISCONTINUED | OUTPATIENT
Start: 2022-07-07 | End: 2022-07-07 | Stop reason: HOSPADM

## 2022-07-07 RX ORDER — DEXAMETHASONE SODIUM PHOSPHATE 10 MG/ML
8 INJECTION, SOLUTION INTRAMUSCULAR; INTRAVENOUS ONCE
Status: DISCONTINUED | OUTPATIENT
Start: 2022-07-07 | End: 2022-07-07 | Stop reason: HOSPADM

## 2022-07-07 RX ORDER — CELECOXIB 100 MG/1
200 CAPSULE ORAL ONCE
Status: DISCONTINUED | OUTPATIENT
Start: 2022-07-07 | End: 2022-07-07 | Stop reason: HOSPADM

## 2022-07-07 RX ORDER — SODIUM CHLORIDE 0.9 % (FLUSH) 0.9 %
5-40 SYRINGE (ML) INJECTION EVERY 12 HOURS SCHEDULED
Status: DISCONTINUED | OUTPATIENT
Start: 2022-07-07 | End: 2022-07-07 | Stop reason: HOSPADM

## 2022-07-07 RX ORDER — ACETAMINOPHEN 500 MG
1000 TABLET ORAL ONCE
Status: DISCONTINUED | OUTPATIENT
Start: 2022-07-07 | End: 2022-07-07 | Stop reason: HOSPADM

## 2022-07-07 RX ORDER — SODIUM CHLORIDE 9 MG/ML
INJECTION, SOLUTION INTRAVENOUS PRN
Status: DISCONTINUED | OUTPATIENT
Start: 2022-07-07 | End: 2022-07-07 | Stop reason: HOSPADM

## 2022-07-07 ASSESSMENT — PAIN - FUNCTIONAL ASSESSMENT: PAIN_FUNCTIONAL_ASSESSMENT: 0-10

## 2022-07-07 ASSESSMENT — PAIN DESCRIPTION - DESCRIPTORS: DESCRIPTORS: SHARP;THROBBING

## 2022-07-07 NOTE — PROGRESS NOTES
Dr Dorota Santana notified pt has not taken his synthroid in over 1 month. Pt states he was sent the wrong medication and has not been able to get in contact with his doctor, states doctor \"is hard to get a hold of\". Dr Dorota Santana in to bedside to explain the risks of surgery under this circumstance. IV removed, pt to get dressed, requested to see Dr Marlon Oliver before leaving. Notified Clyde YIN, pt would like to speak to doctor.

## 2022-07-07 NOTE — PROGRESS NOTES
Orthopedic surgery    Mr. Ct Hamilton has not taken his Synthroid medication for over 1 month. He is being canceled today per the anesthesiologist due to risk of complications secondary to being off this medication. He was instructed to contact his primary care physician. A new referral was placed today for a primary care physician. He will have to be back on his Synthroid and cleared from medical and anesthesiology standpoints, at which time we will reschedule to proceed with right total hip arthroplasty. I discussed this in detail with the patient today.     Allan Hinkle MD  [address] [place] [address]

## 2022-07-07 NOTE — H&P
Take by mouth  D-5000 125 MCG (5000 UT) TABS tablet, TAKE 1 TABLET BY MOUTH EVERY DAY  Baclofen (LIORESAL) 5 MG tablet, Take 5 mg by mouth 3 times daily   ibuprofen (ADVIL;MOTRIN) 800 MG tablet, Take 800 mg by mouth every 8 hours as needed   diclofenac (VOLTAREN) 75 MG EC tablet, Take 1 tablet by mouth 2 times daily  levothyroxine (SYNTHROID) 125 MCG tablet, take 1 tablet by mouth once daily    Allergies:  Patient has no known allergies. History of allergic reaction to anesthesia:  No    Social History:   TOBACCO:   reports that he has never smoked. He has never used smokeless tobacco.  ETOH:   reports previous alcohol use. DRUGS:   reports no history of drug use. Family History:   No family history on file. REVIEW OF SYSTEMS:  CONSTITUTIONAL:  negative  RESPIRATORY:  negative  CARDIOVASCULAR:  negative  MUSCULOSKELETAL:  negative except for  HPI  NEUROLOGICAL:  negative    PHYSICAL EXAM:     VITALS:  There were no vitals taken for this visit. Gen: AOx3, NAD    Heart:  normal apical pulses, normal S1 and S2 and no edema    Lungs:  No increased work of breathing, good air exchange     Abdomen:  no scars, non-distended and non-tender    Extremities:  No clubbing, cyanosis, or edema    Musculoskeletal:      Gait exam is antalgic. The pelvis is asymmetic. In the supine position, there is pain with log roll. Hip range of motion is 120 degrees flexion, and 30 degrees abduction. With the hip flexed 90 degrees, internal rotation and external rotation is limited. FADIR (flexion, adduction, internal rotation) testing is positive.   SOFI (flexion, abduction, external rotation) testing is negative.      Patient does have significant shortening of the right leg in comparison to the left leg- approximately 4 cm LLD.          DATA:  CBC:   Lab Results   Component Value Date/Time    WBC 8.4 06/27/2022 12:13 PM    RBC 4.58 06/27/2022 12:13 PM    HGB 14.1 06/27/2022 12:13 PM    HCT 41.8 06/27/2022 12:13 PM    MCV 91.3 06/27/2022 12:13 PM    MCH 30.8 06/27/2022 12:13 PM    MCHC 33.7 06/27/2022 12:13 PM    RDW 12.5 06/27/2022 12:13 PM     06/27/2022 12:13 PM    MPV 8.8 06/27/2022 12:13 PM     BMP:    Lab Results   Component Value Date/Time     06/27/2022 12:13 PM    K 4.5 06/27/2022 12:13 PM     06/27/2022 12:13 PM    CO2 22 06/27/2022 12:13 PM    BUN 12 06/27/2022 12:13 PM    LABALBU 3.9 12/24/2014 01:20 PM    CREATININE 0.7 06/27/2022 12:13 PM    CALCIUM 10.0 06/27/2022 12:13 PM    GFRAA >60 06/27/2022 12:13 PM    LABGLOM >60 06/27/2022 12:13 PM    GLUCOSE 101 06/27/2022 12:13 PM       Radiology Review: X-ray right hip and pelvis from 4/28/2020 reviewed demonstrating osteonecrosis of the femoral head with collapse into the superior lateral acetabulum and erosive changes present to the lateral rim of the acetabulum    ASSESSMENT AND PLAN:    1. Patient is a 36 y.o. male with above specified procedure planned right Maximiliano robotic assisted total hip arthroplasty with anesthesia    2. Procedure options, risks and benefits reviewed with patient. Patient expresses understanding and has signed consent form to proceed. 3.  Patient and family were provided with pre-op and post-op instructions, prescription medications, and any other supplied needed post op (slings, braces, etc.)    Controlled substances monitoring: possible medication side effects, risk of tolerance and/or dependence, and alternative treatments discussed, no signs of potential drug abuse or diversion identified and OARRS report reviewed today- activity consistent with treatment plan.       ASHTYN Ventura-CNP  Orthopaedic Surgery   7/7/22  6:29 AM

## 2022-07-07 NOTE — TELEPHONE ENCOUNTER
Patient called office requesting to be placed back on surgery schedule asap. Patient was set to undergo Rt hip ANA Robotic Assisted Total Joint Arthroplasty today 7/7/2022. Patient stopped his synthroid medication without telling provider or hospital / PAT / anesthesia - ultimately he was canceled for today. Patient states that he reached out to his PCP, they filled medication and will begin taking again his synthroid medication. He would like to be reschedule ASAP and does not want to wait any longer. Instructed he would have to be on medication for a couple weeks, Dr Ayah Lovett is off the last week of July and his schedule is already filling up in August but will discuss date he would like to proceed with surgery tomorrow when he is back in office. Patient expressed understanding.     I also provided patient with referral information for establishing new PCP, Dr Clara Sterling

## 2022-07-08 NOTE — TELEPHONE ENCOUNTER
Patient was contacted and instructed he must be on medication for 2-3 weeks prior to getting back onto the schedule. We need documentation that he has seen a PCP / family physician noting he is taking medication and cleared for surgery. Patient expressed understanding.         He states below is the provider who refilled medication 7/7/2022   Hudson Valdes PA-C     PCP - General, Physician Assistant

## 2022-07-16 ENCOUNTER — OFFICE VISIT (OUTPATIENT)
Dept: PRIMARY CARE CLINIC | Age: 42
End: 2022-07-16
Payer: MEDICAID

## 2022-07-16 VITALS
OXYGEN SATURATION: 100 % | RESPIRATION RATE: 18 BRPM | WEIGHT: 278 LBS | BODY MASS INDEX: 34.57 KG/M2 | TEMPERATURE: 97.5 F | DIASTOLIC BLOOD PRESSURE: 80 MMHG | HEIGHT: 75 IN | SYSTOLIC BLOOD PRESSURE: 122 MMHG | HEART RATE: 93 BPM

## 2022-07-16 DIAGNOSIS — Z11.59 NEED FOR HEPATITIS C SCREENING TEST: ICD-10-CM

## 2022-07-16 DIAGNOSIS — F10.11 HISTORY OF ALCOHOL ABUSE: ICD-10-CM

## 2022-07-16 DIAGNOSIS — Z11.4 ENCOUNTER FOR SCREENING FOR HIV: ICD-10-CM

## 2022-07-16 DIAGNOSIS — Z00.00 ANNUAL PHYSICAL EXAM: ICD-10-CM

## 2022-07-16 DIAGNOSIS — E03.9 HYPOTHYROIDISM, UNSPECIFIED TYPE: ICD-10-CM

## 2022-07-16 DIAGNOSIS — Z00.00 ANNUAL PHYSICAL EXAM: Primary | ICD-10-CM

## 2022-07-16 LAB
CHOLESTEROL, TOTAL: 192 MG/DL (ref 0–199)
HBA1C MFR BLD: 5.6 %
HDLC SERPL-MCNC: 35 MG/DL
LDL CHOLESTEROL CALCULATED: 110 MG/DL (ref 0–99)
TRIGL SERPL-MCNC: 237 MG/DL (ref 0–149)
VLDLC SERPL CALC-MCNC: 47 MG/DL

## 2022-07-16 PROCEDURE — 83036 HEMOGLOBIN GLYCOSYLATED A1C: CPT | Performed by: STUDENT IN AN ORGANIZED HEALTH CARE EDUCATION/TRAINING PROGRAM

## 2022-07-16 PROCEDURE — 99386 PREV VISIT NEW AGE 40-64: CPT | Performed by: STUDENT IN AN ORGANIZED HEALTH CARE EDUCATION/TRAINING PROGRAM

## 2022-07-16 ASSESSMENT — PATIENT HEALTH QUESTIONNAIRE - PHQ9
SUM OF ALL RESPONSES TO PHQ QUESTIONS 1-9: 0
SUM OF ALL RESPONSES TO PHQ QUESTIONS 1-9: 0
SUM OF ALL RESPONSES TO PHQ9 QUESTIONS 1 & 2: 0
1. LITTLE INTEREST OR PLEASURE IN DOING THINGS: 0
2. FEELING DOWN, DEPRESSED OR HOPELESS: 0
SUM OF ALL RESPONSES TO PHQ QUESTIONS 1-9: 0
SUM OF ALL RESPONSES TO PHQ QUESTIONS 1-9: 0

## 2022-07-16 NOTE — PROGRESS NOTES
[facility]  Department of Family Medicine      Patient:  Patel Donaldson 36 y.o. male     Date of Service: 7/16/22      Chief complaint:   Chief Complaint   Patient presents with    Establish Care         History ofPresent Illness   The patient is a 36 y.o. male  presented to the clinic with complaints as above. Hypothyroidism  -following with endo, restarted synthroid    Recovering alcoholic  -has been sober for 6 months now  -going to [employer], has a sponsor, chetna walker     Otherwise, has been doing ok  Denies any chest pain, SOB, nausea, vomiting  -moving bowels and urinating ok     Past Medical History:      Diagnosis Date    Avascular necrosis ([address]     Recovering alcoholic ([facility] [address]     Thyroid disease        PastSurgical History:        Procedure Laterality Date    WISDOM TOOTH EXTRACTION         Allergies:    Patient has no known allergies.     Social History:   Social History     Socioeconomic History    Marital status: Single     Spouse name: Not on file    Number of children: Not on file    Years of education: Not on file    Highest education level: Not on file   Occupational History    Not on file   Tobacco Use    Smoking status: Never    Smokeless tobacco: Never   Vaping Use    Vaping Use: Never used   Substance and Sexual Activity    Alcohol use: Not Currently     Comment: sober 5 months as of 07/01/2022    Drug use: Never    Sexual activity: Not on file   Other Topics Concern    Not on file   Social History Narrative    ** Merged History Encounter **          Social Determinants of Health     Financial Resource Strain: Not on file   Food Insecurity: Not on file   Transportation Needs: Not on file   Physical Activity: Not on file   Stress: Not on file   Social Connections: Not on file   Intimate Partner Violence: Not on file   Housing Stability: Not on file        Family History:       Problem Relation Age of Onset    Other Mother         elba, MS    Cancer Mother skin and bone cancer    Cancer Father         blood and bone cancer,  due to this       Review of Systems:   Review of Systems - as above     Physical Exam   Vitals: /80   Pulse 93   Temp 97.5 °F (36.4 °C) (Infrared)   Resp 18   Ht 6' 3\" (1.905 m)   Wt 278 lb (126.1 kg)   SpO2 100%   BMI 34.75 kg/m²   Physical Exam  Constitutional:       Appearance: He is well-developed. HENT:      Head: Normocephalic and atraumatic. Eyes:      General:         Right eye: No discharge. Left eye: No discharge. Conjunctiva/sclera: Conjunctivae normal.   Neck:      Trachea: No tracheal deviation. Cardiovascular:      Rate and Rhythm: Normal rate and regular rhythm. Heart sounds: Normal heart sounds. Pulmonary:      Effort: Pulmonary effort is normal. No respiratory distress. Breath sounds: Normal breath sounds. No wheezing. Abdominal:      General: Bowel sounds are normal. There is no distension. Palpations: Abdomen is soft. Tenderness: There is no abdominal tenderness. Musculoskeletal:         General: Tenderness present. Cervical back: Normal range of motion and neck supple. Skin:     General: Skin is warm and dry. Neurological:      Mental Status: He is alert. Psychiatric:         Behavior: Behavior normal.           Assessment and Plan       1. Annual physical exam  Needs lab work and screening as below  -Has surgery scheduled next week, low risk candidate  -A1c in office was 5.6, which is normal however high normal, advised patient to start watching diet and exercising after surgery   -See back after surgery to ensure he is improving  - POCT glycosylated hemoglobin (Hb A1C)  - LIPID PANEL; Future  - Hepatitis C Antibody; Future  - HIV Screen; Future    2. Hypothyroidism, unspecified type  F/u  -Following with specialist, on his usual dosage, has repeat TSH 6 weeks    3.  History of alcohol abuse  F/u  -Has been sober for 6 months now, encouraged to continue, has appropriate counseling and is with AA    4. Encounter for screening for HIV  HCM:  - HIV Screen; Future    5. Need for hepatitis C screening test  HCM:   - Hepatitis C Antibody; Future    Counseled regarding above diagnosis, including possible risks and complications,  especially if left uncontrolled. Counseled regarding the possible side effects, risks, benefits and alternatives to treatment;patient and/or guardian verbalizes understanding, agrees, feels comfortable with and wishes to proceed with above treatment plan. Call or go to [address] if symptoms worsen or persist. Advised patient to call with any new medication issues, and, as applicable, read all Rx info from pharmacy to assure aware of all possible risks and side effects of medicationbefore taking. Patient and/or guardian given opportunity to ask questions/raise concerns. The patient verbalized comfort and understanding ofinstructions. I encourage further reading and education about your health conditions. Information on many health conditions is provided by [facility] [employer]: [email or web address]  Please bring any questions to me at your nextvisit. Return to Office: Return for see back after surgery . Medication List:    Current Outpatient Medications   Medication Sig Dispense Refill    Multiple Vitamins-Minerals (THERAPEUTIC MULTIVITAMIN-MINERALS) tablet Take 1 tablet by mouth daily      VITAMIN A PO Take by mouth      D-5000 125 MCG (5000 UT) TABS tablet TAKE 1 TABLET BY MOUTH EVERY DAY      Baclofen (LIORESAL) 5 MG tablet Take 5 mg by mouth 3 times daily       diclofenac (VOLTAREN) 75 MG EC tablet Take 1 tablet by mouth 2 times daily 60 tablet 3    levothyroxine (SYNTHROID) 125 MCG tablet take 1 tablet by mouth once daily 30 tablet 5     No current facility-administered medications for this visit.         Royal Neil, DO       This document may have been prepared at least partially through the use of voice recognition software. Although effort is taken to assure the accuracy ofthis document, it is possible that grammatical, syntax,  or spelling errors may occur.

## 2022-07-18 ASSESSMENT — PROMIS GLOBAL HEALTH SCALE
TO WHAT EXTENT ARE YOU ABLE TO CARRY OUT YOUR EVERYDAY PHYSICAL ACTIVITIES SUCH AS WALKING, CLIMBING STAIRS, CARRYING GROCERIES, OR MOVING A CHAIR [ON A SCALE OF 1 (NOT AT ALL) TO 5 (COMPLETELY)]?: 3
IN GENERAL, PLEASE RATE HOW WELL YOU CARRY OUT YOUR USUAL SOCIAL ACTIVITIES (INCLUDES ACTIVITIES AT HOME, AT WORK, AND IN YOUR COMMUNITY, AND RESPONSIBILITIES AS A PARENT, CHILD, SPOUSE, EMPLOYEE, FRIEND, ETC) [ON A SCALE OF 1 (POOR) TO 5 (EXCELLENT)]?: 3
SUM OF RESPONSES TO QUESTIONS 3, 6, 7, & 8: 19
IN THE PAST 7 DAYS, HOW OFTEN HAVE YOU BEEN BOTHERED BY EMOTIONAL PROBLEMS, SUCH AS FEELING ANXIOUS, DEPRESSED, OR IRRITABLE [ON A SCALE FROM 1 (NEVER) TO 5 (ALWAYS)]?: 1
SUM OF RESPONSES TO QUESTIONS 2, 4, 5, & 10: 14
IN GENERAL, WOULD YOU SAY YOUR HEALTH IS...[ON A SCALE OF 1 (POOR) TO 5 (EXCELLENT)]: 3
IN GENERAL, HOW WOULD YOU RATE YOUR SATISFACTION WITH YOUR SOCIAL ACTIVITIES AND RELATIONSHIPS [ON A SCALE OF 1 (POOR) TO 5 (EXCELLENT)]?: 4
IN GENERAL, WOULD YOU SAY YOUR QUALITY OF LIFE IS...[ON A SCALE OF 1 (POOR) TO 5 (EXCELLENT)]: 4
HOW IS THE PROMIS V1.1 BEING ADMINISTERED?: 2
WHO IS THE PERSON COMPLETING THE PROMIS V1.1 SURVEY?: 0
IN GENERAL, HOW WOULD YOU RATE YOUR MENTAL HEALTH, INCLUDING YOUR MOOD AND YOUR ABILITY TO THINK [ON A SCALE OF 1 (POOR) TO 5 (EXCELLENT)]?: 5
IN THE PAST 7 DAYS, HOW WOULD YOU RATE YOUR PAIN ON AVERAGE [ON A SCALE FROM 0 (NO PAIN) TO 10 (WORST IMAGINABLE PAIN)]?: 9
IN GENERAL, HOW WOULD YOU RATE YOUR PHYSICAL HEALTH [ON A SCALE OF 1 (POOR) TO 5 (EXCELLENT)]?: 2
IN THE PAST 7 DAYS, HOW WOULD YOU RATE YOUR FATIGUE ON AVERAGE [ON A SCALE FROM 1 (NONE) TO 5 (VERY SEVERE)]?: 5

## 2022-07-18 ASSESSMENT — HOOS JR
BENDING TO THE FLOOR TO PICK UP OBJECT: 4
WALKING ON UNEVEN SURFACE: 4
GOING UP OR DOWN STAIRS: 3
HOOS JR RAW SCORE: 22
RISING FROM SITTING: 4
LYING IN BED (TURNING OVER, MAINTAINING HIP POSITION): 4
SITTING: 3
HOOS JR TOTAL INTERVAL SCORE: 15.633
HOOS JR RAW SCORE: 22

## 2022-07-19 ENCOUNTER — HOSPITAL ENCOUNTER (OUTPATIENT)
Dept: PREADMISSION TESTING | Age: 42
Discharge: HOME | End: 2022-07-19
Payer: MEDICAID

## 2022-07-19 VITALS
BODY MASS INDEX: 34.57 KG/M2 | HEIGHT: 75 IN | OXYGEN SATURATION: 96 % | RESPIRATION RATE: 20 BRPM | SYSTOLIC BLOOD PRESSURE: 133 MMHG | TEMPERATURE: 98.6 F | DIASTOLIC BLOOD PRESSURE: 74 MMHG | WEIGHT: 278 LBS | HEART RATE: 85 BPM

## 2022-07-19 LAB
ANION GAP SERPL CALCULATED.3IONS-SCNC: 12 MMOL/L (ref 7–16)
BASOPHILS ABSOLUTE: 0.05 E9/L (ref 0–0.2)
BASOPHILS RELATIVE PERCENT: 0.6 % (ref 0–2)
BUN BLDV-MCNC: 7 MG/DL (ref 6–20)
CALCIUM SERPL-MCNC: 9.9 MG/DL (ref 8.6–10.2)
CHLORIDE BLD-SCNC: 102 MMOL/L (ref 98–107)
CO2: 22 MMOL/L (ref 22–29)
CREAT SERPL-MCNC: 0.7 MG/DL (ref 0.7–1.2)
EOSINOPHILS ABSOLUTE: 0.53 E9/L (ref 0.05–0.5)
EOSINOPHILS RELATIVE PERCENT: 6.3 % (ref 0–6)
GFR AFRICAN AMERICAN: >60
GFR NON-AFRICAN AMERICAN: >60 ML/MIN/1.73
GLUCOSE BLD-MCNC: 107 MG/DL (ref 74–99)
HCT VFR BLD CALC: 38.1 % (ref 37–54)
HEMOGLOBIN: 13.2 G/DL (ref 12.5–16.5)
HEPATITIS C ANTIBODY INTERPRETATION: NORMAL
HIV-1 AND HIV-2 ANTIBODIES: NORMAL
IMMATURE GRANULOCYTES #: 0.03 E9/L
IMMATURE GRANULOCYTES %: 0.4 % (ref 0–5)
LYMPHOCYTES ABSOLUTE: 1.34 E9/L (ref 1.5–4)
LYMPHOCYTES RELATIVE PERCENT: 15.9 % (ref 20–42)
MCH RBC QN AUTO: 30.8 PG (ref 26–35)
MCHC RBC AUTO-ENTMCNC: 34.6 % (ref 32–34.5)
MCV RBC AUTO: 89 FL (ref 80–99.9)
MONOCYTES ABSOLUTE: 0.82 E9/L (ref 0.1–0.95)
MONOCYTES RELATIVE PERCENT: 9.7 % (ref 2–12)
NEUTROPHILS ABSOLUTE: 5.67 E9/L (ref 1.8–7.3)
NEUTROPHILS RELATIVE PERCENT: 67.1 % (ref 43–80)
PDW BLD-RTO: 12.5 FL (ref 11.5–15)
PLATELET # BLD: 350 E9/L (ref 130–450)
PMV BLD AUTO: 8.8 FL (ref 7–12)
POTASSIUM SERPL-SCNC: 4.1 MMOL/L (ref 3.5–5)
RBC # BLD: 4.28 E12/L (ref 3.8–5.8)
SODIUM BLD-SCNC: 136 MMOL/L (ref 132–146)
WBC # BLD: 8.4 E9/L (ref 4.5–11.5)

## 2022-07-19 PROCEDURE — 85025 COMPLETE CBC W/AUTO DIFF WBC: CPT

## 2022-07-19 PROCEDURE — 36415 COLL VENOUS BLD VENIPUNCTURE: CPT

## 2022-07-19 PROCEDURE — 80048 BASIC METABOLIC PNL TOTAL CA: CPT

## 2022-07-19 ASSESSMENT — PAIN DESCRIPTION - PAIN TYPE: TYPE: CHRONIC PAIN

## 2022-07-19 ASSESSMENT — HOOS JR
RISING FROM SITTING: 4
HOOS JR TOTAL INTERVAL SCORE: 20.805
HOOS JR RAW SCORE: 21
SITTING: 3
GOING UP OR DOWN STAIRS: 4
HOOS JR RAW SCORE: 21
LYING IN BED (TURNING OVER, MAINTAINING HIP POSITION): 3
BENDING TO THE FLOOR TO PICK UP OBJECT: 3
WALKING ON UNEVEN SURFACE: 4

## 2022-07-19 ASSESSMENT — PAIN - FUNCTIONAL ASSESSMENT: PAIN_FUNCTIONAL_ASSESSMENT: PREVENTS OR INTERFERES SOME ACTIVE ACTIVITIES AND ADLS

## 2022-07-19 ASSESSMENT — PAIN DESCRIPTION - LOCATION: LOCATION: HIP

## 2022-07-19 ASSESSMENT — PAIN DESCRIPTION - ONSET: ONSET: ON-GOING

## 2022-07-19 ASSESSMENT — PAIN DESCRIPTION - ORIENTATION: ORIENTATION: RIGHT

## 2022-07-19 ASSESSMENT — PAIN SCALES - GENERAL: PAINLEVEL_OUTOF10: 10

## 2022-07-19 NOTE — PROGRESS NOTES
[facility] PRE-ADMISSION TESTING INSTRUCTIONS    The Preadmission Testing patient is instructed accordingly using the following criteria (check applicable):    ARRIVAL INSTRUCTIONS:  [x] Parking the day of Surgery is located in the Main Entrance lot. Upon entering the door, make an immediate right to the surgery reception desk    [x] Bring photo ID and insurance card    [] Bring in a copy of Living will or Durable Power of  papers. [x] Please be sure to arrange for responsible adult to provide transportation to and from the hospital    [x] Please arrange for responsible adult to be with you for the 24 hour period post procedure due to having anesthesia    [x] If you awake am of surgery not feeling well or have temperature >100 please call [phone]    GENERAL INSTRUCTIONS:    [x] Nothing by mouth after midnight, including gum, candy, mints or water    [x] You may brush your teeth, but do not swallow any water    [] Take medications as instructed with 1-2 oz of water    [x] Stop herbal supplements and vitamins 5 days prior to procedure    [] Follow preop dosing of blood thinners per physician instructions    [] Take 1/2 dose of evening insulin, but no insulin after midnight    [] No oral diabetic medications after midnight    [] If diabetic and have low blood sugar or feel symptomatic, take 1-2oz apple juice only    [] Bring inhalers day of surgery    [] Bring C-PAP/ Bi-Pap day of surgery    [] Bring urine specimen day of surgery    [] Shower or bath with soap, lather and rinse well, AM of Surgery, no lotion, powders or creams to surgical site    [] Follow bowel prep as instructed per surgeon    [x] No tobacco products within 24 hours of surgery     [x] No alcohol or illegal drug use within 24 hours of surgery.     [x] Jewelry, body piercing's, eyeglasses, contact lenses and dentures are not permitted into surgery (bring cases)      [] Please do not wear any nail polish, make up or hair products on the day of surgery    [x] You can expect a call the business day prior to procedure to notify you if your arrival time changes    [x] If you receive a survey after surgery we would greatly appreciate your comments    [] Parent/guardian of a minor must accompany their child and remain on the premises  the entire time they are under our care     [] Pediatric patients may bring favorite toy, blanket or comfort item with them    [] A caregiver or family member must remain with the patient during their stay if they are mentally handicapped, have dementia, disoriented or unable to use a call light or would be a safety concern if left unattended    [x] Please notify surgeon if you develop any illness between now and time of surgery (cold, cough, sore throat, fever, nausea, vomiting) or any signs of infections  including skin, wounds, and dental.    [x]  The Outpatient Pharmacy is available to fill your prescription here on your day of surgery, ask your preop nurse for details    [x] Other instructions: wear loose, comfortable clothing    EDUCATIONAL MATERIALS PROVIDED:    [x] PAT Preoperative Education Packet/Booklet     [x] Medication List    [] Transfusion bracelet applied with instructions    [x] Shower with soap, lather and rinse well, and use CHG wipes provided the evening before surgery as instructed    [x] Incentive spirometer with instructions

## 2022-07-21 NOTE — H&P
History and Physical  K. Ping Peacock MD      Chief Complaint       Augie Smith, a 36year old male, is seen today for a right hip pain and weakness for a duration of  about 2.5 years. Patient has h/o right hip dislocations and has seen chiropractor for adjustments. Patient rates his pain as 9/10. Patient states that medication makes the pain better and WB, at night makes the pain worse. He arrived today using crutches. Augie Smith was not seen by another provider for this condition. Patient had prior imaging, x-rays, CT at [facility]. Pain: groin, thigh, lateral hip  Symptoms: swelling, instability, night pain  Previous Treatment: home exercise program, crutches  Additional Comments: Augie Smith is a 36Years Old Male who presents to the office for evaluation of right hip pain. He has pain for the past 2.5 years that has progressively gotten worse. He has seen Dr. Berenice Murphy in the past.  He was scheduled for a right LUTHER but cancelled for a second opinon. Currently pain is 10/10. Pain is worse with activity, putting on socks and shoes, getting in and out a car and pain improves with rest. He takes ibuprofen, diclofenac, tylenol for pain. Past medical history is significant for previous alcoholism. He works in [employer].       Physical Exam   General Appearance:   Awake, alert, oriented x3  Well developed, well nourished  Obese  No acute distress  Eyes appear Normal    Respiratory:       Respiratory effort: non-labored    Cardiovascular:   Edema: absent      Varicosities: absent    Lymphatic/Skin:       Skin Appearance: Normal              Right Hip Exam   Skin Exam:    skin intact  Flexion contracture:   no  Pain with log roll:   yes  TTP Greater Trochanter:   no  Painful ROM:   yes  FADIR:   yes  SOFI:   yes  Stinchfield:   yes  Hip flexion:   90  Hip extension:   0  Internal rotation:   0  External rotation:   15  Abduction:   20  Adduction:   15    Left Hip Exam   Skin Exam:   skin intact  Flexion contracture: no  Pain with log roll:   no  TTP Greater Trochanter:   no  Painful ROM:   no  FADIR:   no  SOFI:   no  Stinchfield:   no  Normal range of motion  Other:   Sensation intact to light touch L1-S1  TA/EHL/GS intact  Palpable DP, W/WP  Calf soft, non tender   Antalgic gait with crutches  Additional Exam Findings:   No tenderness to palpation lumbar spine  Negative straight leg raise     Other Testing   X-Ray Findings July 12, 2022 Xray AP pelvis and 2 views right hip were obtained at [facility] and reviewed in the office today. They show avascular necrosis with collapse of the femoral head, severe degenerative changes with severe narrowing of the joint space, bone on bone superiorly, peripheral osteophytes, subchondral sclerosis and cystic changes. Past Medical History - reviewed  Thyroid disease    Surgical History - reviewed  No pertinent surgical history    Social History - reviewed  Hand dominance: right  Occupation: rest. manager    Risk Factors - reviewed  Patient had a flu shot in the last 12 months? no  The patient is 72 years or older and has had a pneumonia vaccine: n/a  Patient has an implant none  Tobacco status: never smoker  Alcohol use: in the past  Does patient exercise? no  In the past 12 months, have you fallen? no        Current Medications (including meds started today):   ibuprofen 800 mg tablet (ibuprofen)   levothyroxine 125 mcg tablet (levothyroxine)   diclofenac sodium 75 mg tablet,delayed release (DR/EC) (diclofenac sodium) TAKE 1 TABLET BY MOUTH TWICE DAILY  Vitamin D3 125 mcg (5,000 unit) tablet (cholecalciferol (vitamin d3)) TAKE 1 TABLET BY MOUTH EVERY DAY  baclofen 5 mg tablet (baclofen)       Current Allergies (reviewed this update):  No known allergies      Vital Signs   Weight: 280.01 lbs.     (127.01 kg.)  Height: 75 in.    (190.50 cm.)  Pulse Rate: 92  Blood Pressure: 148/88 mm Hg  Body Mass Index: 35.00  Body Surface Area: 2.53 m2      Review of Systems Musculoskeletal: Positive for back pain, muscle weakness, loss of strength, muscle aches, joint pain, muscle cramps. The remainder of the complete review of systems was negative. Impression   1. Avascular necrosis of femoral head: Deteriorated  2. Right hip osteoarthrits: Deteriorated  3. Gait abnormality: Deteriorated    Plan of Care:   I had a long discussion regarding degenerative joint disease. Operative and nonoperative treatment was discussed. Xrays show severe degenerative changes, nonoperative treatment was failed and pain has become a quality of life issue. Marisa Tinacurtis would like to proceed with a Right Maximiliano Total Hip Arthroplasty. The surgery, implants, postoperative course and risks and benefits of surgery were discussed and all questions were answered.

## 2022-07-22 ENCOUNTER — ANESTHESIA EVENT (OUTPATIENT)
Dept: OPERATING ROOM | Age: 42
End: 2022-07-22
Payer: MEDICAID

## 2022-07-22 ENCOUNTER — HOSPITAL ENCOUNTER (OUTPATIENT)
Age: 42
Setting detail: OBSERVATION
Discharge: HOSPICE | End: 2022-07-23
Attending: ORTHOPAEDIC SURGERY | Admitting: ORTHOPAEDIC SURGERY
Payer: MEDICAID

## 2022-07-22 ENCOUNTER — APPOINTMENT (OUTPATIENT)
Dept: GENERAL RADIOLOGY | Age: 42
End: 2022-07-22
Attending: ORTHOPAEDIC SURGERY
Payer: MEDICAID

## 2022-07-22 ENCOUNTER — ANESTHESIA (OUTPATIENT)
Dept: OPERATING ROOM | Age: 42
End: 2022-07-22
Payer: MEDICAID

## 2022-07-22 DIAGNOSIS — M19.90 OSTEOARTHRITIS, UNSPECIFIED OSTEOARTHRITIS TYPE, UNSPECIFIED SITE: ICD-10-CM

## 2022-07-22 DIAGNOSIS — M87.051 AVASCULAR NECROSIS OF BONE OF RIGHT HIP (HCC): Primary | ICD-10-CM

## 2022-07-22 LAB
ANION GAP SERPL CALCULATED.3IONS-SCNC: 14 MMOL/L (ref 7–16)
BUN BLDV-MCNC: 14 MG/DL (ref 6–20)
CALCIUM SERPL-MCNC: 9 MG/DL (ref 8.6–10.2)
CHLORIDE BLD-SCNC: 105 MMOL/L (ref 98–107)
CO2: 19 MMOL/L (ref 22–29)
CREAT SERPL-MCNC: 0.7 MG/DL (ref 0.7–1.2)
GFR AFRICAN AMERICAN: >60
GFR NON-AFRICAN AMERICAN: >60 ML/MIN/1.73
GLUCOSE BLD-MCNC: 175 MG/DL (ref 74–99)
HCT VFR BLD CALC: 37.1 % (ref 37–54)
HEMOGLOBIN: 12.6 G/DL (ref 12.5–16.5)
MCH RBC QN AUTO: 31.1 PG (ref 26–35)
MCHC RBC AUTO-ENTMCNC: 34 % (ref 32–34.5)
MCV RBC AUTO: 91.6 FL (ref 80–99.9)
PDW BLD-RTO: 12.6 FL (ref 11.5–15)
PLATELET # BLD: 360 E9/L (ref 130–450)
PMV BLD AUTO: 8.8 FL (ref 7–12)
POTASSIUM SERPL-SCNC: 4 MMOL/L (ref 3.5–5)
RBC # BLD: 4.05 E12/L (ref 3.8–5.8)
SODIUM BLD-SCNC: 138 MMOL/L (ref 132–146)
WBC # BLD: 20.4 E9/L (ref 4.5–11.5)

## 2022-07-22 PROCEDURE — G0378 HOSPITAL OBSERVATION PER HR: HCPCS

## 2022-07-22 PROCEDURE — 6360000002 HC RX W HCPCS: Performed by: ORTHOPAEDIC SURGERY

## 2022-07-22 PROCEDURE — 3600000005 HC SURGERY LEVEL 5 BASE: Performed by: ORTHOPAEDIC SURGERY

## 2022-07-22 PROCEDURE — 2709999900 HC NON-CHARGEABLE SUPPLY: Performed by: ORTHOPAEDIC SURGERY

## 2022-07-22 PROCEDURE — A4216 STERILE WATER/SALINE, 10 ML: HCPCS | Performed by: ORTHOPAEDIC SURGERY

## 2022-07-22 PROCEDURE — 2580000003 HC RX 258: Performed by: NURSE PRACTITIONER

## 2022-07-22 PROCEDURE — 2580000003 HC RX 258: Performed by: ORTHOPAEDIC SURGERY

## 2022-07-22 PROCEDURE — 7100000001 HC PACU RECOVERY - ADDTL 15 MIN: Performed by: ORTHOPAEDIC SURGERY

## 2022-07-22 PROCEDURE — 6360000002 HC RX W HCPCS: Performed by: NURSE ANESTHETIST, CERTIFIED REGISTERED

## 2022-07-22 PROCEDURE — 7100000000 HC PACU RECOVERY - FIRST 15 MIN: Performed by: ORTHOPAEDIC SURGERY

## 2022-07-22 PROCEDURE — 3600000015 HC SURGERY LEVEL 5 ADDTL 15MIN: Performed by: ORTHOPAEDIC SURGERY

## 2022-07-22 PROCEDURE — 2720000010 HC SURG SUPPLY STERILE: Performed by: ORTHOPAEDIC SURGERY

## 2022-07-22 PROCEDURE — 2500000003 HC RX 250 WO HCPCS: Performed by: ORTHOPAEDIC SURGERY

## 2022-07-22 PROCEDURE — 3700000000 HC ANESTHESIA ATTENDED CARE: Performed by: ORTHOPAEDIC SURGERY

## 2022-07-22 PROCEDURE — 88311 DECALCIFY TISSUE: CPT

## 2022-07-22 PROCEDURE — 85027 COMPLETE CBC AUTOMATED: CPT

## 2022-07-22 PROCEDURE — 97165 OT EVAL LOW COMPLEX 30 MIN: CPT

## 2022-07-22 PROCEDURE — 6370000000 HC RX 637 (ALT 250 FOR IP): Performed by: NURSE PRACTITIONER

## 2022-07-22 PROCEDURE — 3700000001 HC ADD 15 MINUTES (ANESTHESIA): Performed by: ORTHOPAEDIC SURGERY

## 2022-07-22 PROCEDURE — 36415 COLL VENOUS BLD VENIPUNCTURE: CPT

## 2022-07-22 PROCEDURE — 6360000002 HC RX W HCPCS: Performed by: NURSE PRACTITIONER

## 2022-07-22 PROCEDURE — 6370000000 HC RX 637 (ALT 250 FOR IP): Performed by: ORTHOPAEDIC SURGERY

## 2022-07-22 PROCEDURE — 2500000003 HC RX 250 WO HCPCS: Performed by: NURSE ANESTHETIST, CERTIFIED REGISTERED

## 2022-07-22 PROCEDURE — 2580000003 HC RX 258: Performed by: NURSE ANESTHETIST, CERTIFIED REGISTERED

## 2022-07-22 PROCEDURE — 97535 SELF CARE MNGMENT TRAINING: CPT

## 2022-07-22 PROCEDURE — 80048 BASIC METABOLIC PNL TOTAL CA: CPT

## 2022-07-22 PROCEDURE — 73501 X-RAY EXAM HIP UNI 1 VIEW: CPT | Performed by: RADIOLOGY

## 2022-07-22 PROCEDURE — C1776 JOINT DEVICE (IMPLANTABLE): HCPCS | Performed by: ORTHOPAEDIC SURGERY

## 2022-07-22 PROCEDURE — 88304 TISSUE EXAM BY PATHOLOGIST: CPT

## 2022-07-22 PROCEDURE — 2500000003 HC RX 250 WO HCPCS: Performed by: NURSE PRACTITIONER

## 2022-07-22 PROCEDURE — 6360000002 HC RX W HCPCS: Performed by: ANESTHESIOLOGY

## 2022-07-22 DEVICE — HEAD FEM DIA36MM +0MM OFFSET HIP BIOLOX DELT CERAMIC TAPR: Type: IMPLANTABLE DEVICE | Site: HIP | Status: FUNCTIONAL

## 2022-07-22 DEVICE — SHELL ACET SZ F DIA56MM 5 CLUS H TRITANIUM PRESSFIT PRI: Type: IMPLANTABLE DEVICE | Site: HIP | Status: FUNCTIONAL

## 2022-07-22 DEVICE — STEM FEM SZ 7 L114MM NK L37MM 46MM OFFSET 132DEG HIP TI: Type: IMPLANTABLE DEVICE | Site: HIP | Status: FUNCTIONAL

## 2022-07-22 DEVICE — INSERT ACET F 0 DEG 36 MM HIP X3 TRIDENT: Type: IMPLANTABLE DEVICE | Site: HIP | Status: FUNCTIONAL

## 2022-07-22 RX ORDER — SODIUM CHLORIDE 0.9 % (FLUSH) 0.9 %
5-40 SYRINGE (ML) INJECTION PRN
Status: DISCONTINUED | OUTPATIENT
Start: 2022-07-22 | End: 2022-07-22 | Stop reason: HOSPADM

## 2022-07-22 RX ORDER — IPRATROPIUM BROMIDE AND ALBUTEROL SULFATE 2.5; .5 MG/3ML; MG/3ML
1 SOLUTION RESPIRATORY (INHALATION)
Status: DISCONTINUED | OUTPATIENT
Start: 2022-07-22 | End: 2022-07-22 | Stop reason: HOSPADM

## 2022-07-22 RX ORDER — SODIUM CHLORIDE 9 MG/ML
INJECTION, SOLUTION INTRAVENOUS PRN
Status: DISCONTINUED | OUTPATIENT
Start: 2022-07-22 | End: 2022-07-23 | Stop reason: HOSPADM

## 2022-07-22 RX ORDER — MIDAZOLAM HYDROCHLORIDE 2 MG/2ML
2 INJECTION, SOLUTION INTRAMUSCULAR; INTRAVENOUS
Status: DISCONTINUED | OUTPATIENT
Start: 2022-07-22 | End: 2022-07-22 | Stop reason: HOSPADM

## 2022-07-22 RX ORDER — KETOROLAC TROMETHAMINE 30 MG/ML
30 INJECTION, SOLUTION INTRAMUSCULAR; INTRAVENOUS EVERY 6 HOURS
Status: DISCONTINUED | OUTPATIENT
Start: 2022-07-22 | End: 2022-07-23 | Stop reason: HOSPADM

## 2022-07-22 RX ORDER — ROCURONIUM BROMIDE 10 MG/ML
INJECTION, SOLUTION INTRAVENOUS PRN
Status: DISCONTINUED | OUTPATIENT
Start: 2022-07-22 | End: 2022-07-22 | Stop reason: SDUPTHER

## 2022-07-22 RX ORDER — SODIUM CHLORIDE 0.9 % (FLUSH) 0.9 %
5-40 SYRINGE (ML) INJECTION PRN
Status: DISCONTINUED | OUTPATIENT
Start: 2022-07-22 | End: 2022-07-23 | Stop reason: HOSPADM

## 2022-07-22 RX ORDER — ACETAMINOPHEN 500 MG
1000 TABLET ORAL ONCE
Status: COMPLETED | OUTPATIENT
Start: 2022-07-22 | End: 2022-07-22

## 2022-07-22 RX ORDER — PROPOFOL 10 MG/ML
INJECTION, EMULSION INTRAVENOUS PRN
Status: DISCONTINUED | OUTPATIENT
Start: 2022-07-22 | End: 2022-07-22 | Stop reason: SDUPTHER

## 2022-07-22 RX ORDER — SODIUM CHLORIDE 9 MG/ML
INJECTION, SOLUTION INTRAVENOUS CONTINUOUS PRN
Status: DISCONTINUED | OUTPATIENT
Start: 2022-07-22 | End: 2022-07-22 | Stop reason: SDUPTHER

## 2022-07-22 RX ORDER — TRAMADOL HYDROCHLORIDE 50 MG/1
50 TABLET ORAL EVERY 6 HOURS
Status: DISCONTINUED | OUTPATIENT
Start: 2022-07-22 | End: 2022-07-23 | Stop reason: HOSPADM

## 2022-07-22 RX ORDER — LIDOCAINE HYDROCHLORIDE 20 MG/ML
INJECTION, SOLUTION EPIDURAL; INFILTRATION; INTRACAUDAL; PERINEURAL PRN
Status: DISCONTINUED | OUTPATIENT
Start: 2022-07-22 | End: 2022-07-22 | Stop reason: SDUPTHER

## 2022-07-22 RX ORDER — LABETALOL HYDROCHLORIDE 5 MG/ML
10 INJECTION, SOLUTION INTRAVENOUS
Status: DISCONTINUED | OUTPATIENT
Start: 2022-07-22 | End: 2022-07-22 | Stop reason: HOSPADM

## 2022-07-22 RX ORDER — SODIUM CHLORIDE 9 MG/ML
25 INJECTION, SOLUTION INTRAVENOUS PRN
Status: DISCONTINUED | OUTPATIENT
Start: 2022-07-22 | End: 2022-07-22 | Stop reason: HOSPADM

## 2022-07-22 RX ORDER — HYDRALAZINE HYDROCHLORIDE 20 MG/ML
10 INJECTION INTRAMUSCULAR; INTRAVENOUS
Status: DISCONTINUED | OUTPATIENT
Start: 2022-07-22 | End: 2022-07-22 | Stop reason: HOSPADM

## 2022-07-22 RX ORDER — SODIUM CHLORIDE 9 MG/ML
INJECTION, SOLUTION INTRAVENOUS CONTINUOUS
Status: DISCONTINUED | OUTPATIENT
Start: 2022-07-22 | End: 2022-07-22

## 2022-07-22 RX ORDER — ACETAMINOPHEN 325 MG/1
650 TABLET ORAL EVERY 6 HOURS
Status: DISCONTINUED | OUTPATIENT
Start: 2022-07-22 | End: 2022-07-23 | Stop reason: HOSPADM

## 2022-07-22 RX ORDER — VANCOMYCIN HYDROCHLORIDE 1 G/20ML
INJECTION, POWDER, LYOPHILIZED, FOR SOLUTION INTRAVENOUS PRN
Status: DISCONTINUED | OUTPATIENT
Start: 2022-07-22 | End: 2022-07-22 | Stop reason: ALTCHOICE

## 2022-07-22 RX ORDER — SODIUM CHLORIDE 0.9 % (FLUSH) 0.9 %
5-40 SYRINGE (ML) INJECTION EVERY 12 HOURS SCHEDULED
Status: DISCONTINUED | OUTPATIENT
Start: 2022-07-22 | End: 2022-07-23 | Stop reason: HOSPADM

## 2022-07-22 RX ORDER — SODIUM CHLORIDE 9 MG/ML
INJECTION, SOLUTION INTRAVENOUS CONTINUOUS
Status: ACTIVE | OUTPATIENT
Start: 2022-07-22 | End: 2022-07-23

## 2022-07-22 RX ORDER — PREGABALIN 75 MG/1
75 CAPSULE ORAL ONCE
Status: COMPLETED | OUTPATIENT
Start: 2022-07-22 | End: 2022-07-22

## 2022-07-22 RX ORDER — ONDANSETRON 2 MG/ML
4 INJECTION INTRAMUSCULAR; INTRAVENOUS
Status: DISCONTINUED | OUTPATIENT
Start: 2022-07-22 | End: 2022-07-22 | Stop reason: HOSPADM

## 2022-07-22 RX ORDER — OXYCODONE HYDROCHLORIDE 5 MG/1
5 TABLET ORAL EVERY 4 HOURS PRN
Status: DISCONTINUED | OUTPATIENT
Start: 2022-07-22 | End: 2022-07-23 | Stop reason: HOSPADM

## 2022-07-22 RX ORDER — ONDANSETRON 2 MG/ML
4 INJECTION INTRAMUSCULAR; INTRAVENOUS EVERY 6 HOURS PRN
Status: DISCONTINUED | OUTPATIENT
Start: 2022-07-22 | End: 2022-07-23 | Stop reason: HOSPADM

## 2022-07-22 RX ORDER — OXYCODONE HYDROCHLORIDE 5 MG/1
10 TABLET ORAL EVERY 4 HOURS PRN
Status: DISCONTINUED | OUTPATIENT
Start: 2022-07-22 | End: 2022-07-23 | Stop reason: HOSPADM

## 2022-07-22 RX ORDER — SODIUM CHLORIDE 0.9 % (FLUSH) 0.9 %
5-40 SYRINGE (ML) INJECTION EVERY 12 HOURS SCHEDULED
Status: DISCONTINUED | OUTPATIENT
Start: 2022-07-22 | End: 2022-07-22 | Stop reason: HOSPADM

## 2022-07-22 RX ORDER — FENTANYL CITRATE 50 UG/ML
INJECTION, SOLUTION INTRAMUSCULAR; INTRAVENOUS PRN
Status: DISCONTINUED | OUTPATIENT
Start: 2022-07-22 | End: 2022-07-22 | Stop reason: SDUPTHER

## 2022-07-22 RX ORDER — LEVOTHYROXINE SODIUM 0.12 MG/1
125 TABLET ORAL DAILY
Status: DISCONTINUED | OUTPATIENT
Start: 2022-07-22 | End: 2022-07-23 | Stop reason: HOSPADM

## 2022-07-22 RX ORDER — SENNA AND DOCUSATE SODIUM 50; 8.6 MG/1; MG/1
1 TABLET, FILM COATED ORAL 2 TIMES DAILY
Status: DISCONTINUED | OUTPATIENT
Start: 2022-07-22 | End: 2022-07-23 | Stop reason: HOSPADM

## 2022-07-22 RX ORDER — KETAMINE HYDROCHLORIDE 10 MG/ML
INJECTION, SOLUTION INTRAMUSCULAR; INTRAVENOUS PRN
Status: DISCONTINUED | OUTPATIENT
Start: 2022-07-22 | End: 2022-07-22 | Stop reason: SDUPTHER

## 2022-07-22 RX ORDER — DEXAMETHASONE SODIUM PHOSPHATE 4 MG/ML
INJECTION, SOLUTION INTRA-ARTICULAR; INTRALESIONAL; INTRAMUSCULAR; INTRAVENOUS; SOFT TISSUE PRN
Status: DISCONTINUED | OUTPATIENT
Start: 2022-07-22 | End: 2022-07-22 | Stop reason: SDUPTHER

## 2022-07-22 RX ORDER — NEOSTIGMINE METHYLSULFATE 1 MG/ML
INJECTION, SOLUTION INTRAVENOUS PRN
Status: DISCONTINUED | OUTPATIENT
Start: 2022-07-22 | End: 2022-07-22 | Stop reason: SDUPTHER

## 2022-07-22 RX ORDER — ONDANSETRON 4 MG/1
4 TABLET, ORALLY DISINTEGRATING ORAL EVERY 8 HOURS PRN
Status: DISCONTINUED | OUTPATIENT
Start: 2022-07-22 | End: 2022-07-23 | Stop reason: HOSPADM

## 2022-07-22 RX ORDER — DEXAMETHASONE SODIUM PHOSPHATE 10 MG/ML
8 INJECTION, SOLUTION INTRAMUSCULAR; INTRAVENOUS ONCE
Status: DISCONTINUED | OUTPATIENT
Start: 2022-07-22 | End: 2022-07-22 | Stop reason: HOSPADM

## 2022-07-22 RX ORDER — CELECOXIB 100 MG/1
200 CAPSULE ORAL ONCE
Status: COMPLETED | OUTPATIENT
Start: 2022-07-22 | End: 2022-07-22

## 2022-07-22 RX ORDER — MEPERIDINE HYDROCHLORIDE 25 MG/ML
12.5 INJECTION INTRAMUSCULAR; INTRAVENOUS; SUBCUTANEOUS EVERY 5 MIN PRN
Status: DISCONTINUED | OUTPATIENT
Start: 2022-07-22 | End: 2022-07-22 | Stop reason: HOSPADM

## 2022-07-22 RX ORDER — ONDANSETRON 2 MG/ML
INJECTION INTRAMUSCULAR; INTRAVENOUS PRN
Status: DISCONTINUED | OUTPATIENT
Start: 2022-07-22 | End: 2022-07-22 | Stop reason: SDUPTHER

## 2022-07-22 RX ORDER — MIDAZOLAM HYDROCHLORIDE 1 MG/ML
INJECTION INTRAMUSCULAR; INTRAVENOUS PRN
Status: DISCONTINUED | OUTPATIENT
Start: 2022-07-22 | End: 2022-07-22 | Stop reason: SDUPTHER

## 2022-07-22 RX ORDER — SODIUM CHLORIDE 9 MG/ML
INJECTION, SOLUTION INTRAVENOUS PRN
Status: DISCONTINUED | OUTPATIENT
Start: 2022-07-22 | End: 2022-07-22 | Stop reason: HOSPADM

## 2022-07-22 RX ORDER — LABETALOL HYDROCHLORIDE 5 MG/ML
INJECTION, SOLUTION INTRAVENOUS PRN
Status: DISCONTINUED | OUTPATIENT
Start: 2022-07-22 | End: 2022-07-22 | Stop reason: SDUPTHER

## 2022-07-22 RX ORDER — KETOROLAC TROMETHAMINE 30 MG/ML
INJECTION, SOLUTION INTRAMUSCULAR; INTRAVENOUS PRN
Status: DISCONTINUED | OUTPATIENT
Start: 2022-07-22 | End: 2022-07-22 | Stop reason: SDUPTHER

## 2022-07-22 RX ORDER — BACLOFEN 10 MG/1
10 TABLET ORAL 3 TIMES DAILY
Status: DISCONTINUED | OUTPATIENT
Start: 2022-07-22 | End: 2022-07-23 | Stop reason: HOSPADM

## 2022-07-22 RX ORDER — GLYCOPYRROLATE 0.2 MG/ML
INJECTION INTRAMUSCULAR; INTRAVENOUS PRN
Status: DISCONTINUED | OUTPATIENT
Start: 2022-07-22 | End: 2022-07-22 | Stop reason: SDUPTHER

## 2022-07-22 RX ADMIN — PREGABALIN 75 MG: 75 CAPSULE ORAL at 09:27

## 2022-07-22 RX ADMIN — LABETALOL HYDROCHLORIDE 2.5 MG: 5 INJECTION INTRAVENOUS at 12:12

## 2022-07-22 RX ADMIN — ONDANSETRON 4 MG: 2 INJECTION INTRAMUSCULAR; INTRAVENOUS at 13:12

## 2022-07-22 RX ADMIN — SODIUM CHLORIDE: 9 INJECTION, SOLUTION INTRAVENOUS at 09:28

## 2022-07-22 RX ADMIN — BACLOFEN 10 MG: 10 TABLET ORAL at 17:14

## 2022-07-22 RX ADMIN — FENTANYL CITRATE 25 MCG: 50 INJECTION, SOLUTION INTRAMUSCULAR; INTRAVENOUS at 12:35

## 2022-07-22 RX ADMIN — TRAMADOL HYDROCHLORIDE 50 MG: 50 TABLET, COATED ORAL at 22:35

## 2022-07-22 RX ADMIN — ACETAMINOPHEN 650 MG: 325 TABLET ORAL at 17:14

## 2022-07-22 RX ADMIN — CELECOXIB 200 MG: 100 CAPSULE ORAL at 09:27

## 2022-07-22 RX ADMIN — TRANEXAMIC ACID 1000 MG: 1 INJECTION, SOLUTION INTRAVENOUS at 11:43

## 2022-07-22 RX ADMIN — TRANEXAMIC ACID 1000 MG: 1 INJECTION, SOLUTION INTRAVENOUS at 14:43

## 2022-07-22 RX ADMIN — CEFAZOLIN 3000 MG: 10 INJECTION, POWDER, FOR SOLUTION INTRAVENOUS at 19:08

## 2022-07-22 RX ADMIN — PROPOFOL 200 MG: 10 INJECTION, EMULSION INTRAVENOUS at 11:35

## 2022-07-22 RX ADMIN — Medication 3 MG: at 13:30

## 2022-07-22 RX ADMIN — ROCURONIUM BROMIDE 50 MG: 10 INJECTION, SOLUTION INTRAVENOUS at 11:35

## 2022-07-22 RX ADMIN — LIDOCAINE HYDROCHLORIDE 100 MG: 20 INJECTION, SOLUTION EPIDURAL; INFILTRATION; INTRACAUDAL; PERINEURAL at 11:35

## 2022-07-22 RX ADMIN — ACETAMINOPHEN 1000 MG: 500 TABLET ORAL at 09:27

## 2022-07-22 RX ADMIN — SODIUM CHLORIDE: 9 INJECTION, SOLUTION INTRAVENOUS at 12:47

## 2022-07-22 RX ADMIN — FENTANYL CITRATE 25 MCG: 50 INJECTION, SOLUTION INTRAMUSCULAR; INTRAVENOUS at 12:27

## 2022-07-22 RX ADMIN — FENTANYL CITRATE 50 MCG: 50 INJECTION, SOLUTION INTRAMUSCULAR; INTRAVENOUS at 12:05

## 2022-07-22 RX ADMIN — KETAMINE HYDROCHLORIDE 30 MG: 10 INJECTION INTRAMUSCULAR; INTRAVENOUS at 11:35

## 2022-07-22 RX ADMIN — SODIUM CHLORIDE: 9 INJECTION, SOLUTION INTRAVENOUS at 11:19

## 2022-07-22 RX ADMIN — HYDROMORPHONE HYDROCHLORIDE 0.5 MG: 1 INJECTION, SOLUTION INTRAMUSCULAR; INTRAVENOUS; SUBCUTANEOUS at 14:47

## 2022-07-22 RX ADMIN — KETOROLAC TROMETHAMINE 30 MG: 30 INJECTION, SOLUTION INTRAMUSCULAR; INTRAVENOUS at 22:35

## 2022-07-22 RX ADMIN — BACLOFEN 10 MG: 10 TABLET ORAL at 22:35

## 2022-07-22 RX ADMIN — HYDROMORPHONE HYDROCHLORIDE 0.5 MG: 1 INJECTION, SOLUTION INTRAMUSCULAR; INTRAVENOUS; SUBCUTANEOUS at 14:07

## 2022-07-22 RX ADMIN — SODIUM CHLORIDE, PRESERVATIVE FREE 10 ML: 5 INJECTION INTRAVENOUS at 22:36

## 2022-07-22 RX ADMIN — KETOROLAC TROMETHAMINE 30 MG: 30 INJECTION, SOLUTION INTRAMUSCULAR; INTRAVENOUS at 13:12

## 2022-07-22 RX ADMIN — LABETALOL HYDROCHLORIDE 2.5 MG: 5 INJECTION INTRAVENOUS at 12:31

## 2022-07-22 RX ADMIN — FENTANYL CITRATE 100 MCG: 50 INJECTION, SOLUTION INTRAMUSCULAR; INTRAVENOUS at 11:35

## 2022-07-22 RX ADMIN — ASPIRIN 325 MG: 325 TABLET, COATED ORAL at 17:14

## 2022-07-22 RX ADMIN — HYDROMORPHONE HYDROCHLORIDE 0.5 MG: 1 INJECTION, SOLUTION INTRAMUSCULAR; INTRAVENOUS; SUBCUTANEOUS at 14:19

## 2022-07-22 RX ADMIN — ACETAMINOPHEN 650 MG: 325 TABLET ORAL at 22:35

## 2022-07-22 RX ADMIN — TRAMADOL HYDROCHLORIDE 50 MG: 50 TABLET, COATED ORAL at 17:15

## 2022-07-22 RX ADMIN — LEVOTHYROXINE SODIUM 125 MCG: 0.12 TABLET ORAL at 17:14

## 2022-07-22 RX ADMIN — MIDAZOLAM 2 MG: 1 INJECTION INTRAMUSCULAR; INTRAVENOUS at 11:20

## 2022-07-22 RX ADMIN — KETAMINE HYDROCHLORIDE 10 MG: 10 INJECTION INTRAMUSCULAR; INTRAVENOUS at 12:42

## 2022-07-22 RX ADMIN — FENTANYL CITRATE 50 MCG: 50 INJECTION, SOLUTION INTRAMUSCULAR; INTRAVENOUS at 11:59

## 2022-07-22 RX ADMIN — DOCUSATE SODIUM 50 MG AND SENNOSIDES 8.6 MG 1 TABLET: 8.6; 5 TABLET, FILM COATED ORAL at 22:35

## 2022-07-22 RX ADMIN — GLYCOPYRROLATE 0.6 MG: 0.2 INJECTION INTRAMUSCULAR; INTRAVENOUS at 13:30

## 2022-07-22 RX ADMIN — KETOROLAC TROMETHAMINE 30 MG: 30 INJECTION, SOLUTION INTRAMUSCULAR; INTRAVENOUS at 17:41

## 2022-07-22 RX ADMIN — CEFAZOLIN 3000 MG: 10 INJECTION, POWDER, FOR SOLUTION INTRAVENOUS at 11:20

## 2022-07-22 RX ADMIN — DEXAMETHASONE SODIUM PHOSPHATE 10 MG: 4 INJECTION, SOLUTION INTRAMUSCULAR; INTRAVENOUS at 11:55

## 2022-07-22 RX ADMIN — ROCURONIUM BROMIDE 20 MG: 10 INJECTION, SOLUTION INTRAVENOUS at 12:07

## 2022-07-22 ASSESSMENT — PAIN DESCRIPTION - LOCATION
LOCATION: HIP

## 2022-07-22 ASSESSMENT — PAIN DESCRIPTION - ORIENTATION
ORIENTATION: RIGHT

## 2022-07-22 ASSESSMENT — PAIN DESCRIPTION - DESCRIPTORS
DESCRIPTORS: ACHING;DISCOMFORT
DESCRIPTORS: ACHING;CRUSHING;DISCOMFORT
DESCRIPTORS: ACHING
DESCRIPTORS: ACHING;DULL;DISCOMFORT

## 2022-07-22 ASSESSMENT — PAIN SCALES - GENERAL
PAINLEVEL_OUTOF10: 6
PAINLEVEL_OUTOF10: 7
PAINLEVEL_OUTOF10: 5
PAINLEVEL_OUTOF10: 10
PAINLEVEL_OUTOF10: 10
PAINLEVEL_OUTOF10: 8
PAINLEVEL_OUTOF10: 7
PAINLEVEL_OUTOF10: 8

## 2022-07-22 ASSESSMENT — LIFESTYLE VARIABLES
HOW MANY STANDARD DRINKS CONTAINING ALCOHOL DO YOU HAVE ON A TYPICAL DAY: PATIENT DOES NOT DRINK
SMOKING_STATUS: 0
HOW OFTEN DO YOU HAVE A DRINK CONTAINING ALCOHOL: NEVER

## 2022-07-22 ASSESSMENT — PAIN DESCRIPTION - PAIN TYPE: TYPE: CHRONIC PAIN

## 2022-07-22 NOTE — ANESTHESIA PRE PROCEDURE
Department of Anesthesiology  Preprocedure Note       Name:  Tushar Koo   Age:  36 y.o.  :  1980                                          MRN:  [id number]         Date:  2022      Surgeon: Lonna Frankel):  Deadra Hodgkin, MD    Procedure: Procedure(s):  ROBOTIC ANA ASSISTED RIGHT HIP TOTAL ARTHROPLASTY    +++JOHANA+++    Medications prior to admission:   Prior to Admission medications    Medication Sig Start Date End Date Taking? Authorizing Provider   Multiple Vitamins-Minerals (THERAPEUTIC MULTIVITAMIN-MINERALS) tablet Take 1 tablet by mouth daily    Historical Provider, MD   VITAMIN A PO Take by mouth    Historical Provider, MD   D-5000 125 MCG (5000 UT) TABS tablet TAKE 1 TABLET BY MOUTH EVERY DAY 22   Historical Provider, MD   Baclofen (LIORESAL) 5 MG tablet Take 10 mg by mouth in the morning and 10 mg at noon and 10 mg before bedtime.  22   Historical Provider, MD   diclofenac (VOLTAREN) 75 MG EC tablet Take 1 tablet by mouth 2 times daily 22   Elzbieta Patel MD   levothyroxine (SYNTHROID) 125 MCG tablet take 1 tablet by mouth once daily 7/20/15   Jack Beltran MD       Current medications:    Current Facility-Administered Medications   Medication Dose Route Frequency Provider Last Rate Last Admin    dexamethasone (PF) (DECADRON) injection 8 mg  8 mg IntraVENous Once Shamika Mendocino, APRN - NP        0.9 % sodium chloride infusion   IntraVENous Continuous Shamika Mendocino, APRN -  mL/hr at 22 0928 New Bag at 22 0928    sodium chloride flush 0.9 % injection 5-40 mL  5-40 mL IntraVENous 2 times per day Shamika Evita, APRN - NP        sodium chloride flush 0.9 % injection 5-40 mL  5-40 mL IntraVENous PRN Shamika Mendocino, APRN - NP        0.9 % sodium chloride infusion   IntraVENous PRN Shamkia Mendocino, APRN - NP        ceFAZolin (ANCEF) 3,000 mg in dextrose 5 % 100 mL IVPB  3,000 mg IntraVENous On Call to [address], APRN - TRINI Chaudhary tranexamic acid (CYKLOKAPRON) 1,000 mg in sodium chloride 0.9 % 100 mL IVPB  1,000 mg IntraVENous On Call to [address] APRN - NP           Allergies:  No Known Allergies    Problem List:    Patient Active Problem List   Diagnosis Code    Hypothyroid E03.9    History of alcohol abuse F10.11    Status post total hip replacement, right Z96.641       Past Medical History:        Diagnosis Date    Arthritis     [facility]     Recovering alcoholic ([facility]     Thyroid disease        Past Surgical History:        Procedure Laterality Date    WISDOM TOOTH EXTRACTION         Social History:    Social History     Tobacco Use    Smoking status: Never    Smokeless tobacco: Never    Tobacco comments:     Hooka quit 2010   Substance Use Topics    Alcohol use: Not Currently     Comment: sober 5 months as of 07/01/2022                                Counseling given: Not Answered  Tobacco comments: Hooka quit 2010      Vital Signs (Current):   Vitals:    07/22/22 0922   BP: (!) 146/96   Pulse: 78   Resp: 15   Temp: 97.3 °F (36.3 °C)   SpO2: 97%                                              BP Readings from Last 3 Encounters:   07/22/22 (!) 146/96   07/19/22 133/74   07/16/22 122/80       NPO Status: Time of last liquid consumption: 0000                        Time of last solid consumption: 0000                        Date of last liquid consumption: 07/21/22                        Date of last solid food consumption: 07/21/22    BMI:   Wt Readings from Last 3 Encounters:   07/19/22 278 lb (126.1 kg)   07/16/22 278 lb (126.1 kg)   07/07/22 282 lb (127.9 kg)     There is no height or weight on file to calculate BMI.    CBC:   Lab Results   Component Value Date/Time    WBC 8.4 07/19/2022 11:18 AM    RBC 4.28 07/19/2022 11:18 AM    HGB 13.2 07/19/2022 11:18 AM    HCT 38.1 07/19/2022 11:18 AM    MCV 89.0 07/19/2022 11:18 AM    RDW 12.5 07/19/2022 11:18 AM     07/19/2022 11:18 AM       CMP:   Lab Results   Component Value Date/Time     07/19/2022 11:18 AM    K 4.1 07/19/2022 11:18 AM     07/19/2022 11:18 AM    CO2 22 07/19/2022 11:18 AM    BUN 7 07/19/2022 11:18 AM    CREATININE 0.7 07/19/2022 11:18 AM    GFRAA >60 07/19/2022 11:18 AM    LABGLOM >60 07/19/2022 11:18 AM    GLUCOSE 107 07/19/2022 11:18 AM    PROT 6.4 12/24/2014 01:20 PM    CALCIUM 9.9 07/19/2022 11:18 AM    BILITOT 0.9 12/24/2014 01:20 PM    ALKPHOS 100 12/24/2014 01:20 PM    AST 31 12/24/2014 01:20 PM    ALT 55 12/24/2014 01:20 PM       POC Tests: No results for input(s): POCGLU, POCNA, POCK, POCCL, POCBUN, POCHEMO, POCHCT in the last 72 hours. Coags:   Lab Results   Component Value Date/Time    PROTIME 10.6 12/24/2014 04:40 AM    INR 1.0 12/24/2014 04:40 AM    APTT 30.3 12/24/2014 04:40 AM       HCG (If Applicable): No results found for: PREGTESTUR, PREGSERUM, HCG, HCGQUANT     ABGs: No results found for: PHART, PO2ART, WSI3XCS, IWE0MTZ, BEART, V7SPSBIV     Type & Screen (If Applicable):  No results found for: LABABO, LABRH    Drug/Infectious Status (If Applicable):  No results found for: HIV, HEPCAB    COVID-19 Screening (If Applicable): No results found for: COVID19        Anesthesia Evaluation  Patient summary reviewed no history of anesthetic complications:   Airway: Mallampati: II  TM distance: >3 FB   Neck ROM: full  Mouth opening: > = 3 FB   Dental: normal exam         Pulmonary: breath sounds clear to auscultation      (-) not a current smoker                           Cardiovascular:          ECG reviewed  Rhythm: regular  Rate: normal                    Neuro/Psych:   (+) psychiatric history (Recovering alcoholic ):            GI/Hepatic/Renal: Neg GI/Hepatic/Renal ROS            Endo/Other:    (+) hypothyroidism: arthritis (Avascular necrosis ): OA., .                 Abdominal:   (+) obese,           Vascular: negative vascular ROS.          Other Findings:           Anesthesia Plan      general     ASA 3 Induction: intravenous. MIPS: Postoperative opioids intended and Prophylactic antiemetics administered. Anesthetic plan and risks discussed with patient. Plan discussed with CRNA.                     Mehdi Anderson MD   7/22/2022

## 2022-07-22 NOTE — DISCHARGE SUMMARY
Karolina Aid  [id number]  36 y.o.  1980    Admit date: 7/22/2022    Discharge date and time: 7/23/2022    Admitting Physician: ADRIANA Lyles MD    Discharge Physician: Charity Ignacio. Bhupendra Lyles MD    Admission Diagnoses: right Hip Osteoarthritis, right hip AVN    Discharge Diagnoses: Same    Admission Condition: Good    Discharged Condition: Good    Procedure and Date: right Total Hip Arthroplasty     Hospital Course: A total hip arthroplasty was performed on 7/22/2022. Following this procedure the patient was admitted for a 1 day postoperative hospital stay. During this admission, DVT prophylaxis was followed using bilateral ICDs and ASA. Post-operative pain control was achieved with the use of IV/oral pain medications. Discharge plans were discussed with the patient with the aid of . Disposition: Home    Patient Instructions:     Medications for pain:    Acetaminophen - Take one tablet every 6 hours for 1 week. Then take every 6 hours as needed for pain. Tramadol - Take one tablet every 6 hours for 1 week. Then take every 6 hours as needed for pain. Oxycodone 1 tablet every 4 hours as needed for pain. You can take 2 every 6 hours for severe pain. Medication for DVT prophylaxis (Prevention of blood clots)  Aspirin - Take 1 tablet daily for 6 weeks for prevention of blood clots    Medication for constipation:  Colace - Take 1 tablet every 12 hours as needed for constipation        Activity: Weight bear as tolerated with a walker. Posterior hip precautions. Diet: regular diet  Wound Care: Patient should remove dressing in 9 days. Patient can shower with the dressing on but should not bathe. After removing, the dressing keep incision clean and dry    Follow-up with Dr. Meredith Galvan in 2 weeks.

## 2022-07-22 NOTE — CARE COORDINATION
Social Work:    Patient underwent a robotic Maximiliano assisted right LUTHER today. Danielito Vasquez resides alone but advises social service that he plans to stay with his sister in [facility] [employer] post discharge and will need HHC, a possible walker, 3-1 commode, and ETB. Social work advised Danielito Vasquez that we may not be able to arrange [place] [address] until Monday. Danielito Vasquez was advised to confirm whether or not he has a wheeled walker so  can order it tomorrow. Danielito Vasquez was also advised to obtain his sister's address for .  to follow-up in a.m.      Electronically signed by CONTRERAS Reyes on 7/22/2022 at 4:24 PM

## 2022-07-22 NOTE — DISCHARGE INSTRUCTIONS
Orthopaedic Patient Instructions:     Medications for pain:    Acetaminophen - Take one tablet every 6 hours for 1 week. Then take every 6 hours as needed for pain. Tramadol - Take one tablet every 6 hours for 1 week. Then take every 6 hours as needed for pain. Oxycodone 1 tablet every 4 hours as needed for pain. You can take 2 every 6 hours for severe pain. Medication for DVT prophylaxis (Prevention of blood clots)  Aspirin - Take 1 tablet daily for 6 weeks for prevention of blood clots    Medication for constipation:  Colace - Take 1 tablet every 12 hours as needed for constipation        Activity: posterior hip precautions, weight bear as tolerated  Diet: regular diet  Wound Care: Patient should remove dressing in 9 days. Patient can shower with the dressing on but should not bathe. After removing, the dressing keep incision clean and dry    Follow-up with Dr. Debbie Goyal in 2 weeks. Call for an appointment      Referral sent to [facility] requested information faxed to [phone], number to reach [place] [address] is [phone]. Home Care orders are in. Referral sent to [facility] -[phone], are reviewing if they can provide DME for patient,awaiting response.   [employer] and commode on Monday, [address] can start care on Wednesday-added to avs.

## 2022-07-22 NOTE — OP NOTE
Operative Report  Donovan Whatley  [id number]  7/22/2022    Pre-operative diagnosis:  1. Right hip avascular necrosis  2. Right hip degenerative joint disease    Post-operative diagnosis:  1. Right hip avascular necrosis  2. Right hip degenerative joint disease    Procedure: Maximiliano right total hip arthroplasty    Surgeon: Yumiko Larson MD    Assistant:  Rony Stubbs NP; assisted with positioning, retracting and closing    Anesthesia:  General    Operative Indication: Sudhir Modi is an 35 y/o male with right hip avascular necrosis and degenerative joint disease and worsening right hip pain for the past year. The pain has become a quality of life issue and the patient has failed nonoperative treatment. He presents for a total hip arthroplasty. The risks and benefits of surgery were discussed and all questions were answered. The risks for surgery include bleeding, infection, damage to blood vessels, damage to nerves, risk of further surgery, chronic pain,  restricted range of motion, risk of continued discomfort, risk of need for further reconstructive procedures, leg length discrepancy, risk of need for altered activities and altered gait, risk of blood clots, pulmonary embolism, myocardial infarction, and risk of death were discussed. The patient understood these risks and consented for surgery. EBL: 670 cc    Complications: None    Components: 1. [employer] Accolade II Stem, 132°, size 7    2. [employer] Trident cup, size 56    3. Jaime ceramic head, size 36, +0    Operative Procedure: Following general anesthesia, the patient was positioned lateral decubitus with the body supported by a peg board. A Maximiliano robotic marker was placed on the patella superficially. The affected leg and hip were prepped and draped in the usual standard fashion. The down leg was padded and protected. The hip and groin were sealed with Mylene Childes.  An intra-operative time out was called to confirm the planned surgical procedure and implant was stable with rotation. A trial head and neck were placed and the hip was reduced. The hip was stable on exam. The hip was then dislocated and trial implants removed. The hip was then thoroughly irrigated with normal saline. A size 7 [employer] Accolade II stem was then impacted. It was once again trialed with a +0 size 36 head. With these components, the hip was stable and had excellent ROM. Final components were then impacted onto the stem and the hip was again reduced. The surgical site was irrigated using pulsatile lavage. The Balb[place] pins and all Maximiliano markers were removed. The capsule and external rotators were repaired with #5 ethibond through 2 drill holes in the greater trochanter. The IT band and gluteus fascia were closed using #1 Stratafix. The skin was closed in layers using 2-0, 3-0 Stratafix, dermabond and steri strips. A dry, sterile dressing was applied. The patient was then transferred to an orthopaedic bed with an abduction pillow between the legs. The patient was taken to recovery in stable condition.     Ro Tolliver MD

## 2022-07-22 NOTE — INTERVAL H&P NOTE
Update History & Physical    H&P reviewed. No changes.      Electronically signed by Denise Whalen MD on 7/22/2022 at 9:15 AM

## 2022-07-22 NOTE — PROGRESS NOTES
Occupational Therapy    OCCUPATIONAL THERAPY INITIAL EVALUATION    [facility] [place], [place]         Date:2022                                                  Patient Name: Mario Fleming    MRN: [id number]    : 1980    Room: 9110/8182-O      Evaluating OT: Jorge Alberto Duarte OTR/L   [id number]      Referring Srinivas Alex MD    Specific Provider Orders/Date:OT eval and treat 2022      Diagnosis:  Osteoarthritis, unspecified osteoarthritis type, unspecified site [M19.90]  Avascular necrosis of bone of right hip [facility]    Surgery: R LUTHER 2022     Pertinent Medical History: recovering alcoholic     Precautions:  Fall Risk, WBAT R LE,posterolateral hip precautions      Assessment of current deficits    [x] Functional mobility  [x]ADLs  [x] Strength               []Cognition    [x] Functional transfers   [x] IADLs         [x] Safety Awareness   [x]Endurance    [] Fine Coordination              [x] Balance      [] Vision/perception   []Sensation     []Gross Motor Coordination  [] ROM  [] Delirium                   [] Motor Control     OT PLAN OF CARE   OT POC based on physician orders, patient diagnosis and results of clinical assessment    Frequency/Duration  2-4 days/wk for 2 weeks PRN   Specific OT Treatment Interventions to include:   ADL retraining/adapted techniques and AE recommendations to increase functional independence within precautions                    Energy conservation techniques to improve tolerance for selfcare routine   Functional transfer/mobility training/DME recommendations for increased independence, safety and fall prevention         Patient/family education to increase safety and functional independence             Environmental modifications for safe mobility and completion of ADLs                             Therapeutic activity to improve functional performance during ADLs. Therapeutic exercise to improve tolerance and functional strength for ADLs    Balance retraining/tolerance tasks for facilitation of postural control with dynamic challenges during ADLs . Positioning to improve functional independence      Recommended Adaptive Equipment: BSC,walker ,lower body AE    Home Living: Pt lives alone, 1 floor apartment with no steps. Tub/shower. Patient was Independent and used crutches   Patient is planning in discharging to his sisters in [facility] [employer] 2 story with 2 steps to enter. Bed/bath on 2nd. Tub/shower      Pain Level: no pain this session ;   Cognition: A&O: 4/4;    Memory:  good    Sequencing:  good    Problem solving:  good     Judgement/safety:  good      Functional Assessment:  AM-PAC Daily Activity Raw Score: 17/24   Initial Eval Status  Date: 7/22/22 Treatment Status  Date: STGs = [facility]  Time frame: 10-14 days   Feeding Independent      Grooming Set-up   Independent    UB Dressing Set-up  Independent    LB Dressing Mod A    Educated and instructed on lower body AE - after instruction - patient able to don shorts using reacher with Min A  Mod I    Bathing Min A  Mod I    Toileting SBA   Independent    Bed Mobility  SBA  Supine to sit   Independent   Functional Transfers SBA  Sit -stand from bed , chair   Mod I    Functional Mobility SBA,w/walker   Ambulating in room   Mod I  with good tolerance    Balance Sitting:     Static:  Independent     Dynamic:SBA   Standing: SBA   Independent    Activity Tolerance No SOB       Patient feeling anxious - patient was concerned with R leg discrepancy- feeling anxious and upset.   Nurse informed and putting call in to Dr Lester Carbone  with ADL activity    Visual/  Perceptual Glasses: no                Hand Dominance right   AROM (PROM) Strength Additional Info:    MARYANAE  WFL WFL good  and wfl FMC/dexterity noted during ADL tasks       DUSTY MCCLELLANWOOD/[facility] WFL good  and wfl FMC/dexterity noted during ADL tasks Hearing: WFL   Sensation:  No c/o numbness or tingling   Tone: WFL   Edema: none observd     Comments: Upon arrival patient lying on transport cart. At end of session, patient lying in bed  with call light and phone within reach, all lines and tubes intact    Overall patient demonstrated  decreased independence and safety during completion of ADL/functional transfer/mobility tasks. Pt would benefit from continued skilled OT to increase safety and independence with completion of ADL/IADL tasks for functional independence and quality of life. Comments/Treatment:    Patient practiced and was instructed on following during evaluation and OT session:          -Bed mobility - technique and safety         -Tranfers - hand placement, tech and safety         -Mobility - safety, and tech with a device         -ADLs - tech, AE if appropriate/needed   - continue to reinforce and instruct         -Education:  , importance of OOB activity and participating in ADLs, lower body AE, hip precautions             Rehab Potential: good for established goals     Patient / Family Goal: stay at [facility]       Patient and/or family were instructed on functional diagnosis, prognosis/goals and OT plan of care. Demonstrated good  understanding. Eval Complexity: Low    Time In: 1615  Time Out: 1645  Total Treatment Time: 15    Min Units   OT Eval Low 97165 x  1   OT Eval Medium 69373      OT Eval High 80638      OT Re-Eval [id number]       Therapeutic Ex 99623      Therapeutic Activities 30483       ADL/Self Care 39713  15  1   Orthotic Management 68049       Manual 97458     Neuro Re-Ed 41182       Non-Billable Time          Evaluation Time additionally includes thorough review of current medical information, gathering information on past medical history/social history and prior level of function, interpretation of standardized testing/informal observation of tasks, assessment of data and development of plan of care and goals. Kanwal Kang  OTR/L  OT [id number]

## 2022-07-23 VITALS
SYSTOLIC BLOOD PRESSURE: 127 MMHG | HEART RATE: 90 BPM | DIASTOLIC BLOOD PRESSURE: 60 MMHG | TEMPERATURE: 97.9 F | OXYGEN SATURATION: 93 % | BODY MASS INDEX: 34.57 KG/M2 | WEIGHT: 278 LBS | RESPIRATION RATE: 16 BRPM | HEIGHT: 75 IN

## 2022-07-23 LAB
ANION GAP SERPL CALCULATED.3IONS-SCNC: 9 MMOL/L (ref 7–16)
BUN BLDV-MCNC: 14 MG/DL (ref 6–20)
CALCIUM SERPL-MCNC: 9 MG/DL (ref 8.6–10.2)
CHLORIDE BLD-SCNC: 104 MMOL/L (ref 98–107)
CO2: 22 MMOL/L (ref 22–29)
CREAT SERPL-MCNC: 0.8 MG/DL (ref 0.7–1.2)
GFR AFRICAN AMERICAN: >60
GFR NON-AFRICAN AMERICAN: >60 ML/MIN/1.73
GLUCOSE BLD-MCNC: 148 MG/DL (ref 74–99)
HCT VFR BLD CALC: 33.2 % (ref 37–54)
HEMOGLOBIN: 11 G/DL (ref 12.5–16.5)
MCH RBC QN AUTO: 30.8 PG (ref 26–35)
MCHC RBC AUTO-ENTMCNC: 33.1 % (ref 32–34.5)
MCV RBC AUTO: 93 FL (ref 80–99.9)
PDW BLD-RTO: 12.9 FL (ref 11.5–15)
PLATELET # BLD: 330 E9/L (ref 130–450)
PMV BLD AUTO: 9.1 FL (ref 7–12)
POTASSIUM SERPL-SCNC: 4.5 MMOL/L (ref 3.5–5)
RBC # BLD: 3.57 E12/L (ref 3.8–5.8)
SODIUM BLD-SCNC: 135 MMOL/L (ref 132–146)
WBC # BLD: 16.9 E9/L (ref 4.5–11.5)

## 2022-07-23 PROCEDURE — 80048 BASIC METABOLIC PNL TOTAL CA: CPT

## 2022-07-23 PROCEDURE — 2580000003 HC RX 258: Performed by: ORTHOPAEDIC SURGERY

## 2022-07-23 PROCEDURE — 6370000000 HC RX 637 (ALT 250 FOR IP): Performed by: STUDENT IN AN ORGANIZED HEALTH CARE EDUCATION/TRAINING PROGRAM

## 2022-07-23 PROCEDURE — 97116 GAIT TRAINING THERAPY: CPT

## 2022-07-23 PROCEDURE — G0378 HOSPITAL OBSERVATION PER HR: HCPCS

## 2022-07-23 PROCEDURE — 85027 COMPLETE CBC AUTOMATED: CPT

## 2022-07-23 PROCEDURE — 97161 PT EVAL LOW COMPLEX 20 MIN: CPT

## 2022-07-23 PROCEDURE — 36415 COLL VENOUS BLD VENIPUNCTURE: CPT

## 2022-07-23 PROCEDURE — 99212 OFFICE O/P EST SF 10 MIN: CPT | Performed by: FAMILY MEDICINE

## 2022-07-23 PROCEDURE — 97535 SELF CARE MNGMENT TRAINING: CPT

## 2022-07-23 PROCEDURE — 97530 THERAPEUTIC ACTIVITIES: CPT

## 2022-07-23 PROCEDURE — 6370000000 HC RX 637 (ALT 250 FOR IP): Performed by: ORTHOPAEDIC SURGERY

## 2022-07-23 PROCEDURE — 6360000002 HC RX W HCPCS: Performed by: ORTHOPAEDIC SURGERY

## 2022-07-23 RX ORDER — POLYETHYLENE GLYCOL 3350 17 G/17G
17 POWDER, FOR SOLUTION ORAL DAILY
Status: DISCONTINUED | OUTPATIENT
Start: 2022-07-23 | End: 2022-07-23 | Stop reason: HOSPADM

## 2022-07-23 RX ORDER — TRAMADOL HYDROCHLORIDE 50 MG/1
50 TABLET ORAL EVERY 6 HOURS PRN
Qty: 28 TABLET | Refills: 0 | Status: SHIPPED | OUTPATIENT
Start: 2022-07-23 | End: 2022-07-30

## 2022-07-23 RX ORDER — ACETAMINOPHEN 325 MG/1
650 TABLET ORAL EVERY 6 HOURS PRN
Qty: 120 TABLET | Refills: 3 | Status: SHIPPED | OUTPATIENT
Start: 2022-07-23

## 2022-07-23 RX ORDER — OXYCODONE HYDROCHLORIDE 5 MG/1
5 TABLET ORAL EVERY 4 HOURS PRN
Qty: 42 TABLET | Refills: 0 | Status: SHIPPED | OUTPATIENT
Start: 2022-07-23 | End: 2022-07-30

## 2022-07-23 RX ORDER — DOCUSATE SODIUM 100 MG/1
100 CAPSULE, LIQUID FILLED ORAL 2 TIMES DAILY
Qty: 30 CAPSULE | Refills: 1 | Status: SHIPPED | OUTPATIENT
Start: 2022-07-23 | End: 2022-08-22

## 2022-07-23 RX ADMIN — BACLOFEN 10 MG: 10 TABLET ORAL at 14:36

## 2022-07-23 RX ADMIN — DOCUSATE SODIUM 50 MG AND SENNOSIDES 8.6 MG 1 TABLET: 8.6; 5 TABLET, FILM COATED ORAL at 09:52

## 2022-07-23 RX ADMIN — TRAMADOL HYDROCHLORIDE 50 MG: 50 TABLET, COATED ORAL at 04:56

## 2022-07-23 RX ADMIN — CEFAZOLIN 3000 MG: 10 INJECTION, POWDER, FOR SOLUTION INTRAVENOUS at 04:58

## 2022-07-23 RX ADMIN — ASPIRIN 325 MG: 325 TABLET, COATED ORAL at 09:53

## 2022-07-23 RX ADMIN — SODIUM CHLORIDE, PRESERVATIVE FREE 10 ML: 5 INJECTION INTRAVENOUS at 09:58

## 2022-07-23 RX ADMIN — KETOROLAC TROMETHAMINE 30 MG: 30 INJECTION, SOLUTION INTRAMUSCULAR; INTRAVENOUS at 12:23

## 2022-07-23 RX ADMIN — LEVOTHYROXINE SODIUM 125 MCG: 0.12 TABLET ORAL at 09:52

## 2022-07-23 RX ADMIN — KETOROLAC TROMETHAMINE 30 MG: 30 INJECTION, SOLUTION INTRAMUSCULAR; INTRAVENOUS at 04:56

## 2022-07-23 RX ADMIN — ACETAMINOPHEN 650 MG: 325 TABLET ORAL at 12:24

## 2022-07-23 RX ADMIN — POLYETHYLENE GLYCOL 3350 17 G: 17 POWDER, FOR SOLUTION ORAL at 09:52

## 2022-07-23 RX ADMIN — SODIUM CHLORIDE, PRESERVATIVE FREE 10 ML: 5 INJECTION INTRAVENOUS at 04:58

## 2022-07-23 RX ADMIN — TRAMADOL HYDROCHLORIDE 50 MG: 50 TABLET, COATED ORAL at 12:25

## 2022-07-23 RX ADMIN — OXYCODONE 10 MG: 5 TABLET ORAL at 09:54

## 2022-07-23 RX ADMIN — ACETAMINOPHEN 650 MG: 325 TABLET ORAL at 04:56

## 2022-07-23 RX ADMIN — OXYCODONE 10 MG: 5 TABLET ORAL at 15:50

## 2022-07-23 RX ADMIN — BACLOFEN 10 MG: 10 TABLET ORAL at 09:52

## 2022-07-23 RX ADMIN — SODIUM CHLORIDE, PRESERVATIVE FREE 10 ML: 5 INJECTION INTRAVENOUS at 12:24

## 2022-07-23 ASSESSMENT — PAIN DESCRIPTION - ORIENTATION
ORIENTATION: RIGHT

## 2022-07-23 ASSESSMENT — ENCOUNTER SYMPTOMS
COUGH: 0
RHINORRHEA: 0
ABDOMINAL PAIN: 0
SORE THROAT: 0
NAUSEA: 0
EYE REDNESS: 0
VOMITING: 0
DIARRHEA: 0
CHEST TIGHTNESS: 0

## 2022-07-23 ASSESSMENT — PAIN DESCRIPTION - PAIN TYPE: TYPE: ACUTE PAIN;SURGICAL PAIN

## 2022-07-23 ASSESSMENT — PAIN SCALES - GENERAL
PAINLEVEL_OUTOF10: 7
PAINLEVEL_OUTOF10: 8
PAINLEVEL_OUTOF10: 7
PAINLEVEL_OUTOF10: 8

## 2022-07-23 ASSESSMENT — PAIN DESCRIPTION - DESCRIPTORS
DESCRIPTORS: ACHING
DESCRIPTORS: SHARP;THROBBING;TIGHTNESS

## 2022-07-23 ASSESSMENT — PAIN DESCRIPTION - ONSET: ONSET: ON-GOING

## 2022-07-23 ASSESSMENT — PAIN DESCRIPTION - LOCATION
LOCATION: HIP

## 2022-07-23 ASSESSMENT — PAIN - FUNCTIONAL ASSESSMENT
PAIN_FUNCTIONAL_ASSESSMENT: PREVENTS OR INTERFERES SOME ACTIVE ACTIVITIES AND ADLS
PAIN_FUNCTIONAL_ASSESSMENT: PREVENTS OR INTERFERES SOME ACTIVE ACTIVITIES AND ADLS

## 2022-07-23 NOTE — PROGRESS NOTES
Discharge teaching completed at the bedside.  All information regarding DME supplies and home health provided to patient

## 2022-07-23 NOTE — PROGRESS NOTES
Orthopaedic Surgery Progress Note      SUBJECTIVE:  No acute events over night. Pain controlled. Denies chest pain or shortness of breath.     OBJECTIVE:   AAOx3, NAD    Right lower extremity    Dressings C/D/I  SILT L1-S1  TA/EHL/GS intact  Calf soft, NT  +2 DP, W/WP     Data:  CBC:   Lab Results   Component Value Date/Time    WBC 16.9 07/23/2022 05:15 AM    RBC 3.57 07/23/2022 05:15 AM    HGB 11.0 07/23/2022 05:15 AM    HCT 33.2 07/23/2022 05:15 AM    MCV 93.0 07/23/2022 05:15 AM    MCH 30.8 07/23/2022 05:15 AM    MCHC 33.1 07/23/2022 05:15 AM    RDW 12.9 07/23/2022 05:15 AM     07/23/2022 05:15 AM    MPV 9.1 07/23/2022 05:15 AM     BMP:    Lab Results   Component Value Date/Time     07/23/2022 05:15 AM    K 4.5 07/23/2022 05:15 AM     07/23/2022 05:15 AM    CO2 22 07/23/2022 05:15 AM    BUN 14 07/23/2022 05:15 AM    LABALBU 3.9 12/24/2014 01:20 PM    CREATININE 0.8 07/23/2022 05:15 AM    CALCIUM 9.0 07/23/2022 05:15 AM    GFRAA >60 07/23/2022 05:15 AM    LABGLOM >60 07/23/2022 05:15 AM    GLUCOSE 148 07/23/2022 05:15 AM         A/P: POD 1 RTHA ANA   - WBAT  - Pain control  - PT/OT  - DVT prophylaxis  - D/C planning for home with home care     Farnaz CHAMORRO-CNP

## 2022-07-23 NOTE — PROGRESS NOTES
Initial Evaluation      Attending Provider:  Deadra Hodgkin, MD    Evaluating PT:  Tico Ocasio PT, RRT, [place] [address]    Room #:  3779/3586-K  Diagnosis:  Avascular necrosis of femoral head: Deteriorated  Pertinent PMHx/PSHx:  See PMH  Procedure/Surgery:  Maximiliano right total hip arthroplasty  Precautions:  WBAT, [employer] LUTHER   Equipment Needs:  [employer], Abd pillow    SUBJECTIVE:    Pt lives with alone  in a Apt 1 story home with 0 stairs and 0 rail to enter. Pt ambulated with AC indep PTA. Going to [place] to live sister who lives 2 story with 2 GHULAM. OBJECTIVE:   Initial Evaluation  Date: 7/23/22 Treatment Short Term/ Long Term   Goals   Was pt agreeable to Eval/treatment? Yes     Does pt have pain? Min to no pain     Bed Mobility  Rolling: Indep  Supine to sit: Indep  Sit to supine: Indep  Scooting: Indep     Transfers Sit to stand: Indep  Stand to sit: Indep  Stand pivot: Indep     Ambulation   300 feet with [employer] indep  1000 feet with [employer] indep   Stair negotiation: ascended and descended 4 x 3 steps with 2 HR then1 HR and then with SC and 1 rail and was indep all times      ROM Dec RT hip 80*, knee/ankle WNL     MMT RT Hip 3/5, knee/ankle 5/5     AM-PAC 6 Clicks 52/99       Pt is A & O x 3   Sensation:  Pt denies numbness and tingling to extremities  Edema:  Post op RTLE  Balance: sitting:Indep and standing: indep with [employer]  Endurance: functional    Patient education  Pt educated on Hip prec, abd pillow use, steps, [employer] and SC height adjustments, WBAT    Patient response to education:   Pt verbalized understanding Pt demonstrated skill Pt requires further education in this area   Y Y N     ASSESSMENT:    Comments: In hip chair on arrival in no distress. Reports min to no pain. Was walking with AC prior to Sx. Wants to use WW now vs AC's. Extensive conversation for above education. Good knowledge about above discussed educational info. Bed mobility indep all levels. Transfers all surfaces indep.   No dizziness throughout all positional changes. Amb 300+' indep with [employer] w/o any reports inc pain. Steps with 2 HR, then 1 HR and then 1 HR and SC and indep all. No safety issues preventing home D/c today. Call light and wall phon rod bed. Very appreciative for care. Treatment:  Patient practiced and was instructed in the following treatment:    Eval  Amb 300'  Steps x 12 total  Bed mob  Transfers  Education         Pt's/ family goals   1. Home    Patient and or family understand(s) diagnosis, prognosis, and plan of care. PLAN:    PT care will be provided in accordance with the objectives noted above. Exercises and functional mobility practice will be used as well as appropriate assistive devices or modalities to obtain goals. Patient and family education will also be administered as needed. Frequency of treatments:  Daily BID     Total Treatment Time  35 minutes     Evaluation Time includes thorough review of current medical information, gathering information on past medical history/social history and prior level of function, completion of standardized testing/informal observation of tasks, assessment of data and education on plan of care and goals.     CPT codes:  [x] Low Complexity PT evaluation 34209  [] Moderate Complexity PT evaluation 91647  [] High Complexity PT evaluation 72341  [] PT Re-evaluation 20526  [x] Gait training 79997 ** minutes  [] Manual therapy 19241 ** minutes  [x] Therapeutic activities 82968 ** minutes  [x] Therapeutic exercises 82634 ** minutes  [] Neuromuscular reeducation 69831 ** minutes    Hanna Gross PT, RRT, CEAS

## 2022-07-23 NOTE — CARE COORDINATION
Transition of Care-Met with patient, he requested 3-1 commode, wheeled walker and home care. No history of either in the past, no preference on provider-just hopes to be able to leave today. Referral sent to [facility] requested information faxed to [phone], [facility] to [facility] is [phone]. Home Care orders are in. Referral sent to [facility] -[phone], are reviewing if they can provide DME for patient,awaiting response. Vanessa is NOT CONTRACTED WITH [address] [place] [address],[facility] [address]. Referral called to [facility] [place].    Addendum-[facility] can deliver the [employer] and commode on Monday, [facility] can start care on Wednesday-added to avs.        Electronically signed by Bessie Hauser RN on 7/23/2022 at 1:56 PM

## 2022-07-23 NOTE — CONSULTS
daily  levothyroxine (SYNTHROID) 125 MCG tablet, take 1 tablet by mouth once daily    Allergies:    Patient has no known allergies. Social History:    reports that he has never smoked. He has never used smokeless tobacco. He reports that he does not currently use alcohol. He reports that he does not use drugs. Family History:   family history includes Cancer in his father and mother; Other in his mother. ROS:  As above in the HPI, otherwise negative    Physical Exam:    Vitals:  /84   Pulse 84   Temp 98.1 °F (36.7 °C) (Oral)   Resp 16   Ht 6' 3\" (1.905 m)   Wt 278 lb (126.1 kg)   SpO2 98%   BMI 34.75 kg/m²     Physical Exam  Constitutional:       Appearance: Normal appearance. He is obese. HENT:      Head: Normocephalic and atraumatic. Mouth/Throat:      Mouth: Mucous membranes are moist.   Eyes:      Extraocular Movements: Extraocular movements intact. Conjunctiva/sclera: Conjunctivae normal.   Cardiovascular:      Rate and Rhythm: Normal rate and regular rhythm. Pulses: Normal pulses. Heart sounds: Normal heart sounds. No murmur heard. Pulmonary:      Effort: Pulmonary effort is normal.      Breath sounds: No stridor. No wheezing. Abdominal:      General: Abdomen is flat. Bowel sounds are normal.      Palpations: Abdomen is soft. Tenderness: There is no abdominal tenderness. Musculoskeletal:         General: Tenderness (appropriate post op tenderness of R hip) present. No swelling. Normal range of motion. Cervical back: Normal range of motion and neck supple. Comments: Bandage at right hip surgical site C/D/I   Skin:     General: Skin is warm and dry. Neurological:      General: No focal deficit present. Mental Status: He is alert and oriented to person, place, and time.    Psychiatric:         Mood and Affect: Mood normal.         Behavior: Behavior normal.       Labs:    CBC and BMP ordered    Assessment/Plan:      Active Hospital Problems Diagnosis Date Noted    Avascular necrosis of bone of right hip [facility]) [M87.051] 07/22/2022     Priority: Medium    Status post total hip replacement, right [Z96.641] 07/07/2022     Priority: Medium    Hypothyroid [E03.9] 11/08/2012         Avascular Necrosis of Right Femoral Head s/p Right Total Hip Arthoplasty  - Pain management per Ortho: Tylenol 650 mg Q6H, Baclofen 10 mg TID, Toradol 30 mg Q6H, Tramadol 50 mg Q6H, Roxicodone 5 mg/ 10 mg Q4H PRN for moderate or severe pain  - PT/OT  - SW consulted for HHC  - Bowel regimen: Senokot-S BID, Milk of Magnesia QD PRN,    Hypothyroidism  - Continue home Synthroid 125 mcg QD      DVT ppx:  mg QD, PCDs  Code Status: Full      Case discussed with attending on call: Dr. Marco Veronica MD   Family Medicine Resident PGY-2  7/22/2022

## 2022-07-23 NOTE — PROGRESS NOTES
Educated patient on the importance of Incentive Spirometer, Patient returned demonstration of 2500, tolerated well. Will continue to promote hourly usage during hourly rounds.

## 2022-07-23 NOTE — PLAN OF CARE
Problem: Discharge Planning  Goal: Discharge to home or other facility with appropriate resources  Outcome: Progressing     Problem: Pain  Goal: Verbalizes/displays adequate comfort level or baseline comfort level  Outcome: Progressing  Flowsheets (Taken 7/23/2022 0706)  Verbalizes/displays adequate comfort level or baseline comfort level:   Administer analgesics based on type and severity of pain and evaluate response   Consider cultural and social influences on pain and pain management   Implement non-pharmacological measures as appropriate and evaluate response     Problem: ABCDS Injury Assessment  Goal: Absence of physical injury  Outcome: Progressing

## 2022-07-23 NOTE — PROGRESS NOTES
[address] TREATMENT NOTE      Date:2022  Patient Name: Jaylyn Valverde  MRN: [id number]  : 1980  Room: [address] [place] [address]H     Per OT Eval:  Evaluating OT: Jaden Larson OTR/L   [id number]       Referring Porfirio Riojas MD    Specific Provider Orders/Date:OT eval and treat 2022       Diagnosis:  Osteoarthritis, unspecified osteoarthritis type, unspecified site [M19.90]  Avascular necrosis of bone of right hip [facility]    Surgery: R LUTHER 2022     Pertinent Medical History: recovering alcoholic     Precautions:  Fall Risk, WBAT R LE,posterolateral hip precautions       Assessment of current deficits   [x] Functional mobility            [x]ADLs           [x] Strength                  []Cognition   [x] Functional transfers          [x] IADLs         [x] Safety Awareness   [x]Endurance   [] Fine Coordination                         [x] Balance      [] Vision/perception   []Sensation      []Gross Motor Coordination             [] ROM           [] Delirium                   [] Motor Control     OT PLAN OF CARE   OT POC based on physician orders, patient diagnosis and results of clinical assessment     Frequency/Duration  2-4 days/wk for 2 weeks PRN  Specific OT Treatment Interventions to include:   ADL retraining/adapted techniques and AE recommendations to increase functional independence within precautions                    Energy conservation techniques to improve tolerance for selfcare routine  Functional transfer/mobility training/DME recommendations for increased independence, safety and fall prevention         Patient/family education to increase safety and functional independence             Environmental modifications for safe mobility and completion of ADLs                             Therapeutic activity to improve functional performance during ADLs.                                          Therapeutic exercise to improve tolerance and functional strength for ADLs Balance retraining/tolerance tasks for facilitation of postural control with dynamic challenges during ADLs . Positioning to improve functional independence        Recommended Adaptive Equipment: BSC,walker ,lower body AE     Home Living: Pt lives alone, 1 floor apartment with no steps. Tub/shower. Patient was Independent and used crutches  Patient is planning in discharging to his sisters in [facility] [place]. 2 story with 2 steps to enter. Bed/bath on 2nd. Tub/shower       Pain Level: no pain this session ;  Cognition: A&O: 4/4;              Memory:  good               Sequencing:  good               Problem solving:  good                Judgement/safety:  good                 Functional Assessment:  AM-PAC Daily Activity Raw Score: 18/24    Initial Eval Status  Date: 7/22/22 Treatment Status  Date: 7/23/22 STGs = LTGs  Time frame: 10-14 days   Feeding Independent        Grooming Set-up   Independent    UB Dressing Set-up   Independent    LB Dressing Mod A     Educated and instructed on lower body AE - after instruction - patient able to don shorts using reacher with Min A  SBA    To doff/don shorts using reacher and doff/don sock using sock aid Mod I    Bathing Min A   Mod I    Toileting SBA   Independent    Bed Mobility  SBA  Supine to sit Sup  Independent   Functional Transfers SBA  Sit -stand from bed , chair  SBA with wheeled walker  Sit<>stand from EOB  Sit<>stand from chair Mod I    Functional Mobility SBA,w/walker  Ambulating in room  SBA with wheeled walker  Short distance in room Mod I  with good tolerance   Balance Sitting:    Static:  Independent     Dynamic:SBA   Standing: SBA  Sitting: independent  Standing: SBA with wheeled walker Independent    Activity Tolerance No SOB        Patient feeling anxious - patient was concerned with R leg discrepancy- feeling anxious and upset.   Nurse informed and putting call in to Dr Rimma Fleming with ADL activity  Good  with ADL activity   Visual/  Perceptual Glasses: no                        Comments: RN approved patient's participation in activities. Upon arrival, patient lying in bed. At end of session, patient returned to bed with call light and phone within reach and all lines and tubes intact. Treatment: OT treatment provided this date included:   Instruction/training on safety and adapted techniques for completion of ADLs. Pt instructed in use of AD/AE for LB dressing and bathing tasks to maintain hip precautions. Pt verbalize and demonstrated good understanding. Able to to doff/don shorts and socks use AE with cues and SBA. Discussed use of BSC for toileting tasks and pt verbalized good understanding. Instruction/training on safe functional mobility/transfer techniques. Cues for hand placement and safe technique to maximize safety and independence with ADLs and functional tasks. Further skilled OT treatment indicated to increase patient's safety and independence with completion of ADL/IADL tasks in order to maximize patient's functional independence and quality of life. Education: Patient education provided regardin) LB dressing techniques and reinforcement of use of AE to complete 2) safe functional transfers and mobility with wheeled walker 3) hip precautions and impact on ADLs. Patient demonstrated good understanding. Patient has made progress towards set goals. Continue OT plan of care.     Time In: 5011  Time Out: 0918  Total Treatment Time: 23 minutes      Minutes Units   Therapeutic Ex 77126     Therapeutic Activities 31936     ADL/Self Care 07063 23 2   Orthotic Management 39376     Neuro Re-Ed 18625     Non-Billable Time  ---       EMILEE Pickering/L  License Number: [id number]

## 2022-07-23 NOTE — PROGRESS NOTES
[facility]  Progress Note    Chief complaint :  Avascular Necrosis of Right Femoral Head     Subjective:    No overnight problems. Patient describes feeling better. He has ambulated in the hallway and did well. He has not had a BM yet, but has been passing gas. Patient denies chest pain, SOB, nausea, vomiting, fever, chills. Patient is tolerating diet. As per nurse, no acute overnight events    Past medical, surgical, family and social history were reviewed, non-contributory, and unchanged unless otherwise stated. Review of Systems   Constitutional:  Negative for activity change, chills and fever. HENT:  Negative for rhinorrhea, sneezing and sore throat. Eyes:  Negative for redness. Respiratory:  Negative for cough and chest tightness. Cardiovascular:  Negative for chest pain, palpitations and leg swelling. Gastrointestinal:  Negative for abdominal pain, diarrhea, nausea and vomiting. Genitourinary:  Negative for decreased urine volume, dysuria, flank pain, frequency, hematuria and urgency. Musculoskeletal:  Negative for arthralgias and myalgias. Neurological:  Negative for dizziness, syncope, weakness and headaches. Psychiatric/Behavioral:  Negative for agitation. The patient is not nervous/anxious. Objective:  /76   Pulse 77   Temp 98 °F (36.7 °C) (Oral)   Resp 16   Ht 6' 3\" (1.905 m)   Wt 278 lb (126.1 kg)   SpO2 98%   BMI 34.75 kg/m²     Physical Exam  Vitals reviewed. Constitutional:       Appearance: Normal appearance. HENT:      Head: Normocephalic and atraumatic. Nose: Nose normal.      Mouth/Throat:      Mouth: Mucous membranes are moist.   Eyes:      Pupils: Pupils are equal, round, and reactive to light. Cardiovascular:      Rate and Rhythm: Normal rate and regular rhythm. Pulses: Normal pulses. Heart sounds: Normal heart sounds.    Pulmonary:      Effort: Pulmonary effort is normal.      Breath sounds: Normal breath sounds. Abdominal:      General: Bowel sounds are normal. There is no distension. Palpations: Abdomen is soft. Tenderness: There is no abdominal tenderness. There is no guarding. Musculoskeletal:         General: Normal range of motion. Cervical back: Normal range of motion. No rigidity. Right lower leg: No edema. Left lower leg: No edema. Comments: Bandage in place over lateral aspect of right thigh   Skin:     General: Skin is warm. Neurological:      General: No focal deficit present. Mental Status: He is alert and oriented to person, place, and time.    Psychiatric:         Mood and Affect: Mood normal.     Labs:  Recent Results (from the past 24 hour(s))   CBC    Collection Time: 07/22/22  5:30 PM   Result Value Ref Range    WBC 20.4 (H) 4.5 - 11.5 E9/L    RBC 4.05 3.80 - 5.80 E12/L    Hemoglobin 12.6 12.5 - 16.5 g/dL    Hematocrit 37.1 37.0 - 54.0 %    MCV 91.6 80.0 - 99.9 fL    MCH 31.1 26.0 - 35.0 pg    MCHC 34.0 32.0 - 34.5 %    RDW 12.6 11.5 - 15.0 fL    Platelets 198 848 - 128 E9/L    MPV 8.8 7.0 - 12.0 fL   Basic Metabolic Panel    Collection Time: 07/22/22  5:30 PM   Result Value Ref Range    Sodium 138 132 - 146 mmol/L    Potassium 4.0 3.5 - 5.0 mmol/L    Chloride 105 98 - 107 mmol/L    CO2 19 (L) 22 - 29 mmol/L    Anion Gap 14 7 - 16 mmol/L    Glucose 175 (H) 74 - 99 mg/dL    BUN 14 6 - 20 mg/dL    Creatinine 0.7 0.7 - 1.2 mg/dL    GFR Non-African American >60 >=60 mL/min/1.73    GFR African American >60     Calcium 9.0 8.6 - 10.2 mg/dL   CBC    Collection Time: 07/23/22  5:15 AM   Result Value Ref Range    WBC 16.9 (H) 4.5 - 11.5 E9/L    RBC 3.57 (L) 3.80 - 5.80 E12/L    Hemoglobin 11.0 (L) 12.5 - 16.5 g/dL    Hematocrit 33.2 (L) 37.0 - 54.0 %    MCV 93.0 80.0 - 99.9 fL    MCH 30.8 26.0 - 35.0 pg    MCHC 33.1 32.0 - 34.5 %    RDW 12.9 11.5 - 15.0 fL    Platelets 213 814 - 115 E9/L    MPV 9.1 7.0 - 12.0 fL   Basic Metabolic Panel    Collection Time: 07/23/22  5:15 AM   Result Value Ref Range    Sodium 135 132 - 146 mmol/L    Potassium 4.5 3.5 - 5.0 mmol/L    Chloride 104 98 - 107 mmol/L    CO2 22 22 - 29 mmol/L    Anion Gap 9 7 - 16 mmol/L    Glucose 148 (H) 74 - 99 mg/dL    BUN 14 6 - 20 mg/dL    Creatinine 0.8 0.7 - 1.2 mg/dL    GFR Non-African American >60 >=60 mL/min/1.73    GFR African American >60     Calcium 9.0 8.6 - 10.2 mg/dL       Radiology and other tests reviewed:  XR HIP 1 VW W PELVIS RIGHT   Final Result   Satisfactory alignment and positioning post total right hip replacement.              Assessment:  Active Hospital Problems    Diagnosis Date Noted    Avascular necrosis of bone of right hip [facility]) [M87.051] 07/22/2022     Priority: Medium    Status post total hip replacement, right [Z96.641] 07/07/2022     Priority: Medium    Hypothyroid [E03.9] 11/08/2012       Plan:    Avascular Necrosis of Right Femoral Head s/p Right Total Hip Arthoplasty  -Pain management per Ortho: Tylenol 650 mg Q6HR, Baclofen 10 mg TID, Toradol 30 mg Q6HR, Tramadol 50 mg Q6HR, Roxicodone 5 mg/10 mg Q4HR PRN for moderate or severe pain  -PT/OT consulted, appreciate input  -NS  ml/hr per ortho  -BMP daily  -CBC daily  -SW consulted for HHC  -Bowel regimen: Senokot-S 8.6-50 mg BID, Milk of Magnesia 30 ml daily PRN,     Hypothyroidism  -Continue home Synthroid 125 mcg daily        DVT ppx: Aspirin 325 mg daily, PCDs  Code Status: Full  Diet: Regular diet      Disposition: Discharge to Home pending clinical course    Rosalee Sierra MD   Family Medicine Resident PGY-3  07/23/22   10:30 AM

## 2022-07-25 NOTE — ANESTHESIA POSTPROCEDURE EVALUATION
Department of Anesthesiology  Postprocedure Note    Patient: Dg Birch  MRN: [id number]  YOB: 1980  Date of evaluation: 7/25/2022      Procedure Summary     Date: 07/22/22 Room / Location: [facility] OR 05 / [facility]    Anesthesia Start: 1119 Anesthesia Stop: 9688    Procedure: ROBOTIC ANA ASSISTED RIGHT HIP TOTAL ARTHROPLASTY    +++JOHANA+++ (Right: Hip) Diagnosis:       Osteoarthritis, unspecified osteoarthritis type, unspecified site      (Osteoarthritis, unspecified osteoarthritis type, unspecified site [M19.90])    Surgeons: Humberto Small MD Responsible Provider: Rose Marie Severino MD    Anesthesia Type: general ASA Status: 3          Anesthesia Type: No value filed.     Aquiles Phase I: Aquiles Score: 10    Aquiles Phase II:        Anesthesia Post Evaluation    Patient location during evaluation: PACU  Patient participation: complete - patient participated  Level of consciousness: awake  Airway patency: patent  Nausea & Vomiting: no nausea and no vomiting  Complications: no  Cardiovascular status: hemodynamically stable  Respiratory status: acceptable  Hydration status: stable

## 2022-07-26 NOTE — CARE COORDINATION
Social Work:    Social service received a call back from Olivia at [facility] who advised that their [address] [facility] [place] office cannot start care until Saturday. Social work &  [address] called [facility] , [facility], [facility], and all either do not contract with straight Medicaid and/or do not have any openings. Social work also received a call from [employer] [facility] who is checking coverage and will phone social work back.       Electronically signed by CONTRERAS Alanis on 7/26/2022 at 4:03 PM

## 2022-07-26 NOTE — CARE COORDINATION
Social Work:    Received a call from [facility] that he was told his walker & 3-1 commode would be delivered Monday to his sister's home in [facility] [place] [facility] where he is staying. Lissett Fuller also advised that [place] [address] as of yet. Social work reviewed RN case manager Dominique Quiñones note Saturday and called [facility] and they had no referral on file and provided social work with [facility] [phone]. Social work called [facility] ED and they had no referral on file and do not have walkers and commode in stock. Social work called and faxed a referral to [employer] and also left a voice message with Olivia at [facility] after being referral to her from [address] [place] [address] office [phone]. Await call back.     Electronically signed by CONTRERAS Hastings on 7/26/2022 at 2:26 PM

## 2022-07-27 NOTE — CARE COORDINATION
Social Work:    Received a call after hours from [place] advising that their [facility] [employer] [place] [facility] [employer] office cannot start care Saturday as stated and they can only start care locally here on Monday. Also placed a follow-up call to [employer] and they do not have anyone on staff who can check benefits, therefore, they declined. Social work called and faxed [facility] orders to [address] [place] [address] this a.m. Await their evaluation. Social work also spoke with Sabine Blackman  who covered care Saturday and she advised that [facility] advised her DME will be delivered Monday. Social work sent a message to [address] [place] [address] requesting her assistance in obtaining the DME. Await call back.     Electronically signed by CONTRERAS Cummins on 7/27/2022 at 10:59 AM

## 2022-07-28 NOTE — CARE COORDINATION
Social Work:    Received a call back from [employer] in [address] [facility] that they are calling Sudhir Modi today to arrange delivery of his walker & commode. Social service also received a call from Sandy at [facility] advising that they are at full capacity and cannot accept Jagdish's referral.  Social work updated Sudhir Modi and he plans to [facility] [address] Monday when he arrives back to his home. Sudhir Modi states he already talked with Delmy  physical therapist, as well as Dr. Lucy Jones office, and was advised medically about any questions or concerns he may have in the interim. Social service requested Sudhir Modi phone her back if any further needs arise.     Electronically signed by CONTRERAS Tovar on 7/28/2022 at 10:28 AM

## 2022-09-21 ENCOUNTER — HOSPITAL ENCOUNTER (OUTPATIENT)
Dept: PHYSICAL THERAPY | Age: 42
Setting detail: THERAPIES SERIES
Discharge: HOME | End: 2022-09-21
Payer: MEDICAID

## 2022-09-21 PROCEDURE — 97161 PT EVAL LOW COMPLEX 20 MIN: CPT | Performed by: PHYSICAL THERAPIST

## 2022-09-21 ASSESSMENT — PAIN DESCRIPTION - ORIENTATION: ORIENTATION: RIGHT

## 2022-09-21 ASSESSMENT — PAIN DESCRIPTION - LOCATION: LOCATION: HIP

## 2022-09-21 ASSESSMENT — PAIN SCALES - GENERAL: PAINLEVEL_OUTOF10: 0

## 2022-09-21 ASSESSMENT — PAIN DESCRIPTION - FREQUENCY: FREQUENCY: INTERMITTENT

## 2022-09-21 ASSESSMENT — PAIN DESCRIPTION - DESCRIPTORS: DESCRIPTORS: SORE

## 2022-09-21 ASSESSMENT — PAIN DESCRIPTION - PAIN TYPE: TYPE: SURGICAL PAIN;CHRONIC PAIN

## 2022-09-21 NOTE — PROGRESS NOTES
Physical Therapy    Physical Therapy: Initial Evaluation    Patient: Lu Mccurdy (58 y.o. male)   Examination Date:   Plan of Care Certification Period: 2022 to        :  1980 ;    Confirmed: Yes MRN: [id number]  CSN: [id number]   Insurance: Payor: [place] / Plan:  [employer] DEPT OF JOB / Product Type: *No Product type* /   Insurance ID: [id number] - (Medicaid) Secondary Insurance (if applicable):    Referring Physician: Krystal Lai MD     PCP: Stephanie Rodríguez DO Visits to Date/Visits Approved: 1 /      No Show/Cancelled Appts:   /       Medical Diagnosis: Unilateral primary osteoarthritis, left knee [M17.12]  Pain in left knee [M25.562]  Aftercare following joint replacement surgery [Z47.1]  Presence of right artificial hip joint [Z96.641] m17.12 m25.562 z47.1 z96.641  Treatment Diagnosis:       PERTINENT MEDICAL HISTORY           Medical History: Chart Reviewed: Yes   Past Medical History:   Diagnosis Date    Arthritis     Avascular necrosis ([facility]     Recovering alcoholic ([facility]     Thyroid disease      Surgical History:   Past Surgical History:   Procedure Laterality Date    TOTAL HIP ARTHROPLASTY Right 2022    ROBOTIC ANA ASSISTED RIGHT HIP TOTAL ARTHROPLASTY    +++JOHANA+++ performed by Rosalia Loyola MD at [place] [address] EXTRACTION         Medications:   Current Outpatient Medications:     acetaminophen (TYLENOL) 325 MG tablet, Take 2 tablets by mouth every 6 hours as needed for Pain, Disp: 120 tablet, Rfl: 3    aspirin 325 MG EC tablet, Take 1 tablet by mouth in the morning., Disp: 42 tablet, Rfl: 0    Multiple Vitamins-Minerals (THERAPEUTIC MULTIVITAMIN-MINERALS) tablet, Take 1 tablet by mouth daily, Disp: , Rfl:     VITAMIN A PO, Take by mouth, Disp: , Rfl:     D-5000 125 MCG (5000 UT) TABS tablet, TAKE 1 TABLET BY MOUTH EVERY DAY, Disp: , Rfl:     Baclofen (LIORESAL) 5 MG tablet, Take 10 mg by mouth in the morning and 10 mg at noon and 10 mg before bedtime. , Disp: , Rfl:     diclofenac (VOLTAREN) 75 MG EC tablet, Take 1 tablet by mouth 2 times daily, Disp: 60 tablet, Rfl: 3    levothyroxine (SYNTHROID) 125 MCG tablet, take 1 tablet by mouth once daily, Disp: 30 tablet, Rfl: 5  Allergies: Patient has no known allergies. SUBJECTIVE EXAMINATION      ,           Subjective History:       Additional Pertinent Hx (if applicable):     Previous treatments prior to current episode?: Surgery, Outpatient PT  Comments: Patient presents to PT with soreness across R hip region s/p R LUTHER from 7/22/22. Patient reports having outpatient therapy following. Recently finished therapy however now receieved script for aquatic therapy. Patient states that gait mechanics are stable when he takes his time however tends to ambulate quickly due to working at [employer]. He ambulates with and without cane. Pt denies any falls. Pt admits to burning and numbness sensation across L thigh (opposite side). Pt reports generalized weakness across RLE. Pt takes OTC  meds for symptom reduction. Pt states hip precautions have been removed except not lifting > 20lbs.       Learning/Language: Learning  Does the patient/guardian have any barriers to learning?: No barriers  What is the preferred language of the patient/guardian?: English     Pain Screening    Pain Screening  Patient Currently in Pain: Yes  Pain Assessment: 0-10  Pain Level: 0  Best Pain Level: 0  Worst Pain Level: 5  Pain Type: Surgical pain, Chronic pain  Pain Location: Hip  Pain Orientation: Right  Pain Descriptors: Sore  Pain Frequency: Intermittent      OBJECTIVE EXAMINATION   Restrictions:    No lifting > 20lbs     Review of Systems:  Follows Commands: Within Functional Limits  Integumentary Assessment  Scar: incision site- closed; very small eschar noted across most distal aspect of incision (pt reports scratching region)- Pt to contact PT- once eschar tissue is absent then pt can begin aquatic therapy    Palpation: ___________________________   Date:_______                                                                   Darlene Shukla MD        Physician Comments: _______________________________________________    Please sign and return to [facility]. Please fax to the location listed below.  Ruth Olivera for this referral!    [address] [facility]  [facility] [address]  Dept: [phone]  Fax: [phone]       POC NOTE

## 2022-10-03 ENCOUNTER — HOSPITAL ENCOUNTER (OUTPATIENT)
Dept: PHYSICAL THERAPY | Age: 42
Setting detail: THERAPIES SERIES
Discharge: HOME | End: 2022-10-03
Payer: MEDICAID

## 2022-10-03 PROCEDURE — 97113 AQUATIC THERAPY/EXERCISES: CPT

## 2022-10-03 NOTE — PROGRESS NOTES
[facility]  Phone: [phone] Fax: [phone]        Physical Therapy Aquatic Flow Sheet   Date:  10/3/2022    Patient Name:  Francisco Joshi    :  1980  Restrictions/Precautions:    Diagnosis:    Treatment Diagnosis:    Insurance/Certification information:  [place] / Plan: [employer] DEPT OF JOB / Product Type: *No Product type* /   Insurance ID: [id number] -   Referring Physician:  Jennifer Lozoya MD     PCP: Masoud Das DO  Plan of care signed (Y/N):    Visit# / total visits:    Pain level: 1010     Electronically signed by:  Danielle Mccann PTA  Time LH:5503  Time GEK:4718    Subjective:Outpatient PT  Comments: Patient presents to PT with soreness across R hip region s/p R LUTHER from 22. Patient reports having outpatient therapy following. Recently finished therapy however now receieved script for aquatic therapy. Patient states that gait mechanics are stable when he takes his time however tends to ambulate quickly due to working at [email or web address]. He ambulates with and without cane. Pt denies any falls. Pt admits to burning and numbness sensation across L thigh (opposite side). Pt reports generalized weakness across RLE. Pt takes OTC  meds for symptom reduction. Pt states hip precautions have been removed except not lifting > 20lbs.       Key  B= Belt DB= Dumbells T= Theratube   H= Hydrotone N= Noodles W= Weights   P= Paddles S= Speedo equipment K= Kickboard     Exercises/Activities   Warm-up/Amb  Minutes  Exercises  Minutes    Slow forward  5   HR/TR  5    Slow sideways  5  Marches  5    Slow backwards  5  Mini-squats  5    Medium forward    4-way SLR  5    Medium sideways    Hip circles/fig 8  5    Small shuffle    Hamstring curls  5    Jog    Knee extension  5    Braiding    Pelvic tilts  5    Bicycling  5  Scap squeezes          Shoulder flex/ext      Functional    Shoulder abd/add      Step    Shoulder H. abd/add      Lifting    Shoulder IR/ER      Hand to opp knee    Rowing      Push down squat    Bilateral pull down      UE PNF    Push/pull      LE PNF    Push downs      Wall push ups    Arm circles      SLS    Elbow flex/ext          Chin tuck      Stretching    UT shrugs/rolls      Gastroc/Soleus    Rocking horse      Hamstring          SKTC    Other      Piriformis          Hip flexor          Ladder pull          Pec stretch          Post deltoid           Timed Code Treatment Minutes:  60    Total Treatment Minutes:  60    Treatment/Activity Tolerance:  [x] Patient tolerated treatment well [x] Patient limited by fatique  [] Patient limited by pain [] Patient limited by other medical complications  [] Other:     Prognosis: [] Good [x] Fair  [] Poor    Patient Requires Follow-up: [x] Yes  [] No    Plan:   [x] Continue per plan of care [] Alter current plan (see comments)  [] Plan of care initiated [] Hold pending MD visit [] Discharge    Treatment Charges: Mins Units   Initial Evaluation     Re-Evaluation     Ther Exercise         TE     Aquatic Treatment 60 4   Ther Activities        TA     Gait Training          GT     Neuro Re-education NR     Modalities     Non-Billable Service Time     Other     Total Time/Units 60 4         Electronically signed by:  Brown Aranda PTA [id number]

## 2022-10-05 ENCOUNTER — APPOINTMENT (OUTPATIENT)
Dept: PHYSICAL THERAPY | Age: 42
End: 2022-10-05
Payer: MEDICAID

## 2022-10-10 ENCOUNTER — HOSPITAL ENCOUNTER (OUTPATIENT)
Dept: PHYSICAL THERAPY | Age: 42
Setting detail: THERAPIES SERIES
Discharge: HOME | End: 2022-10-10
Payer: MEDICAID

## 2022-10-10 PROCEDURE — 97113 AQUATIC THERAPY/EXERCISES: CPT

## 2022-10-12 ENCOUNTER — HOSPITAL ENCOUNTER (OUTPATIENT)
Dept: PHYSICAL THERAPY | Age: 42
Setting detail: THERAPIES SERIES
Discharge: HOME | End: 2022-10-12
Payer: MEDICAID

## 2022-10-12 PROCEDURE — 97113 AQUATIC THERAPY/EXERCISES: CPT

## 2022-10-17 ENCOUNTER — HOSPITAL ENCOUNTER (OUTPATIENT)
Dept: PHYSICAL THERAPY | Age: 42
Setting detail: THERAPIES SERIES
Discharge: HOME | End: 2022-10-17
Payer: MEDICAID

## 2022-10-17 PROCEDURE — 97113 AQUATIC THERAPY/EXERCISES: CPT

## 2022-10-17 NOTE — PROGRESS NOTES
[facility]  Phone: [phone] Fax: [phone]        Physical Therapy Aquatic Flow Sheet   Date:  10/17/2022    Patient Name:  Carmen Chan    :  1980  Restrictions/Precautions:    Diagnosis:    Treatment Diagnosis:    Insurance/Certification information:  [place] / Plan: 40 [employer] DEPT OF JOB / Product Type: *No Product type* /   Insurance ID: [id number] -   Referring Physician:  Tray Mathur MD     PCP: Kacy Phillips DO  Plan of care signed (Y/N):    Visit# / total visits:    Pain level: 1010     Electronically signed by:  Filippo Moya PTA  Time HF:1683  Time CARLOS:1681    Subjective:Outpatient PT  Comments: Patient presents to PT with soreness across R hip region s/p R LUTHER from 22. Patient reports having outpatient therapy following. Recently finished therapy however now receieved script for aquatic therapy. Patient states that gait mechanics are stable when he takes his time however tends to ambulate quickly due to working at [employer]. He ambulates with and without cane. Pt denies any falls. Pt admits to burning and numbness sensation across L thigh (opposite side). Pt reports generalized weakness across RLE. Pt takes OTC  meds for symptom reduction. Pt states hip precautions have been removed except not lifting > 20lbs.       Key  B= Belt DB= Dumbells T= Theratube   H= Hydrotone N= Noodles W= Weights   P= Paddles S= Speedo equipment K= Kickboard     Exercises/Activities   Warm-up/Amb  Minutes  Exercises  Minutes    Slow forward  5 + BP  HR/TR  5    Slow sideways  5 +DB  Marches  5    Slow backwards  5 + DB  Mini-squats  5    Medium forward    Forward backward kick  5    Medium sideways    Hip abduction  5    Small shuffle    Hamstring curls  5    Jog    Knee extension  5    Braiding    Pelvic tilts  5    Bicycling  5  Scap squeezes          Shoulder flex/ext      Functional    Shoulder abd/add      Step    Shoulder H. abd/add Lifting    Shoulder IR/ER      Hand to opp knee    Rowing      Push down squat    Bilateral pull down      UE PNF    Push/pull      LE PNF    Push downs      Wall push ups    Arm circles      SLS    Elbow flex/ext          Chin tuck      Stretching    UT shrugs/rolls      Gastroc/Soleus    Rocking horse      Hamstring          SKTC    Other      Piriformis          Hip flexor          Ladder pull          Pec stretch          Post deltoid           Timed Code Treatment Minutes:  60    Total Treatment Minutes:  60    Treatment/Activity Tolerance:  [x] Patient tolerated treatment well [x] Patient limited by fatique  [] Patient limited by pain [] Patient limited by other medical complications  [x] Other:     Prognosis: [] Good [x] Fair  [] Poor    Patient Requires Follow-up: [x] Yes  [] No    Plan:   [x] Continue per plan of care [] Alter current plan (see comments)  [] Plan of care initiated [] Hold pending MD visit [] Discharge    Treatment Charges: Mins Units   Initial Evaluation     Re-Evaluation     Ther Exercise         TE     Aquatic Treatment 60 4   Ther Activities        TA     Gait Training          GT     Neuro Re-education NR     Modalities     Non-Billable Service Time     Other     Total Time/Units 60 4         Electronically signed by:  Romulo Cowan PTA [id number]

## 2022-10-17 NOTE — PROGRESS NOTES
[facility]  Phone: [phone] Fax: [phone]        Physical Therapy Aquatic Flow Sheet   Date:  10/17/2022    Patient Name:  Tamara Deras    :  1980  Restrictions/Precautions:    Diagnosis:    Treatment Diagnosis:    Insurance/Certification information:  [place] / Plan: 40 [employer] DEPT OF JOB / Product Type: *No Product type* /   Insurance ID: [id number] -   Referring Physician:  Reyes Paganini, MD     PCP: Fe Schwab DO  Plan of care signed (Y/N):    Visit# / total visits:    Pain level: 1010     Electronically signed by:  Brown Aranda PTA  Time AS:4773  Time EGY:2895      Key  B= Belt DB= Dumbells T= Theratube   H= Hydrotone N= Noodles W= Weights   P= Paddles S= Speedo equipment K= Kickboard     Exercises/Activities   Warm-up/Amb  Minutes  Exercises  Minutes    Slow forward  5 +DBs  HR/TR  5    Slow sideways  5 +BP  Marches  5    Slow backwards  5 +DB  Mini-squats  5    Medium forward   Forward and back ruiz kick  5    Medium sideways    Hip abduction  5    Small shuffle    Hamstring curls  5    Jog    Knee extension  5    Braiding    Pelvic tilts  5    Bicycling  5  Scap squeezes          Shoulder flex/ext      Functional    Shoulder abd/add      Step    Shoulder H. abd/add      Lifting    Shoulder IR/ER      Hand to opp knee    Rowing      Push down squat    Bilateral pull down      UE PNF    Push/pull      LE PNF    Push downs      Wall push ups    Arm circles      SLS    Elbow flex/ext          Chin tuck      Stretching    UT shrugs/rolls      Gastroc/Soleus    Rocking horse      Hamstring          SKTC    Other      Piriformis          Hip flexor          Ladder pull          Pec stretch          Post deltoid           Timed Code Treatment Minutes:  60    Total Treatment Minutes:  60    Treatment/Activity Tolerance:  [x] Patient tolerated treatment well [x] Patient limited by fatique  [] Patient limited by pain [] Patient limited by other medical complications  [] Other:     Prognosis: [] Good [x] Fair  [] Poor    Patient Requires Follow-up: [x] Yes  [] No    Plan:   [x] Continue per plan of care [] Alter current plan (see comments)  [] Plan of care initiated [] Hold pending MD visit [] Discharge    Treatment Charges: Mins Units   Initial Evaluation     Re-Evaluation     Ther Exercise         TE     Aquatic Treatment 60 4   Ther Activities        TA     Gait Training          GT     Neuro Re-education NR     Modalities     Non-Billable Service Time     Other     Total Time/Units 60 4         Electronically signed by:  Danielle Mccann PTA [id number]

## 2022-10-19 ENCOUNTER — APPOINTMENT (OUTPATIENT)
Dept: PHYSICAL THERAPY | Age: 42
End: 2022-10-19
Payer: MEDICAID

## 2022-10-24 ENCOUNTER — APPOINTMENT (OUTPATIENT)
Dept: PHYSICAL THERAPY | Age: 42
End: 2022-10-24
Payer: MEDICAID

## 2022-10-26 ENCOUNTER — HOSPITAL ENCOUNTER (OUTPATIENT)
Dept: PHYSICAL THERAPY | Age: 42
Setting detail: THERAPIES SERIES
Discharge: HOME | End: 2022-10-26
Payer: MEDICAID

## 2022-10-31 ENCOUNTER — HOSPITAL ENCOUNTER (OUTPATIENT)
Dept: PHYSICAL THERAPY | Age: 42
Setting detail: THERAPIES SERIES
Discharge: HOME | End: 2022-10-31
Payer: MEDICAID

## 2022-10-31 PROCEDURE — 97113 AQUATIC THERAPY/EXERCISES: CPT

## 2022-11-02 ENCOUNTER — HOSPITAL ENCOUNTER (OUTPATIENT)
Dept: PHYSICAL THERAPY | Age: 42
Setting detail: THERAPIES SERIES
Discharge: HOME | End: 2022-11-02
Payer: MEDICAID

## 2022-11-02 PROCEDURE — 97113 AQUATIC THERAPY/EXERCISES: CPT

## 2022-11-07 ENCOUNTER — HOSPITAL ENCOUNTER (OUTPATIENT)
Dept: PHYSICAL THERAPY | Age: 42
Setting detail: THERAPIES SERIES
Discharge: HOME | End: 2022-11-07
Payer: MEDICAID

## 2022-11-07 PROCEDURE — 97113 AQUATIC THERAPY/EXERCISES: CPT

## 2022-11-07 NOTE — PROGRESS NOTES
[facility]  Phone: [phone] Fax: [phone]        Physical Therapy Aquatic Flow Sheet   Date:  2022    Patient Name:  Arline Celestin    :  1980  Restrictions/Precautions:    Diagnosis:    Treatment Diagnosis:    Insurance/Certification information:  [place] / Plan: [employer] DEPT OF JOB / Product Type: *No Product type* /   Insurance ID: [id number] -   Referring Physician:  Syed Burch MD     PCP: Tanner Rausch DO    Pain level: 5-6/10     Electronically signed by:  Saintclair Chandler, PTA  Time VC:4187  Time TZ      Key  B= Belt DB= Dumbells T= Theratube   H= Hydrotone N= Noodles W= Weights   P= Paddles S= Speedo equipment K= Kickboard     Exercises/Activities   Warm-up/Amb  Minutes  Exercises  Minutes    Slow forward  5 +DBs  HR/TR  5    Slow sideways  5 +BP  Marches  5    Slow backwards  5 +DB  Mini-squats  5    Medium forward   Forward and back ruiz kick  5    Medium sideways    Hip abduction  5    Small shuffle    Hamstring curls  5    Jog    Knee extension  5    Braiding    Pelvic tilts  5    Bicycling  5  Scap squeezes          Shoulder flex/ext      Functional    Shoulder abd/add      Step    Shoulder H. abd/add      Lifting    Shoulder IR/ER      Hand to opp knee    Rowing      Push down squat    Bilateral pull down      UE PNF    Push/pull      LE PNF    Push downs      Wall push ups    Arm circles      SLS    Elbow flex/ext          Chin tuck      Stretching    UT shrugs/rolls      Gastroc/Soleus    Rocking horse      Hamstring          SKTC    Other      Piriformis          Hip flexor          Ladder pull          Pec stretch          Post deltoid           Timed Code Treatment Minutes:  60    Total Treatment Minutes:  60    Treatment/Activity Tolerance:  [x] Patient tolerated treatment well [] Patient limited by fatique  [] Patient limited by pain [] Patient limited by other medical complications  [x] Other: Pt's balance, confidence and strength improving in pool.      Prognosis: [] Good [x] Fair  [] Poor    Patient Requires Follow-up: [x] Yes  [] No    Plan:   [x] Continue per plan of care [] Alter current plan (see comments)  [] Plan of care initiated [] Hold pending MD visit [] Discharge    Treatment Charges: Mins Units   Initial Evaluation     Re-Evaluation     Ther Exercise         TE     Aquatic Treatment 60 4   Ther Activities        TA     Gait Training          GT     Neuro Re-education NR     Modalities     Non-Billable Service Time     Other     Total Time/Units 60 4         Electronically signed by:  Regi Parson PTA [id number]

## 2022-11-09 ENCOUNTER — HOSPITAL ENCOUNTER (OUTPATIENT)
Dept: PHYSICAL THERAPY | Age: 42
Setting detail: THERAPIES SERIES
Discharge: HOME | End: 2022-11-09
Payer: MEDICAID

## 2022-11-09 NOTE — PROGRESS NOTES
[facility]  Phone: [phone] Fax: [phone]        Physical Therapy Aquatic Flow Sheet   Date:  10/12/2022    Patient Name:  Davida Mooney    :  1980  Restrictions/Precautions:    Diagnosis:    Treatment Diagnosis:    Insurance/Certification information:  [place] / Plan: 40 [employer] DEPT OF JOB / Product Type: *No Product type* /   Insurance ID: [id number] -   Referring Physician:  Myke Hernandez MD     PCP: Donna Childress DO  Plan of care signed (Y/N):    Visit# / total visits:    Pain level: Less 5-6/10     Electronically signed by:  Dianne Caruso PTA  Time CP:5065  Time HOR:4218        Key  B= Belt DB= Dumbells T= Theratube   H= Hydrotone N= Noodles W= Weights   P= Paddles S= Speedo equipment K= Kickboard     Exercises/Activities   Warm-up/Amb  Minutes  Exercises  Minutes    Slow forward  5   HR/TR  5    Slow sideways  5  Marches  5    Slow backwards  5  Mini-squats  5    Medium forward    4-way SLR  5    Medium sideways    Hip circles/fig 8  5    Small shuffle    Hamstring curls  5    Jog    Knee extension  5    Braiding    Pelvic tilts  5    Bicycling  5  Scap squeezes          Shoulder flex/ext      Functional    Shoulder abd/add      Step    Shoulder H. abd/add      Lifting    Shoulder IR/ER      Hand to opp knee    Rowing      Push down squat    Bilateral pull down      UE PNF    Push/pull      LE PNF    Push downs      Wall push ups    Arm circles      SLS    Elbow flex/ext          Chin tuck      Stretching    UT shrugs/rolls      Gastroc/Soleus    Rocking horse      Hamstring          SKTC    Other      Piriformis          Hip flexor          Ladder pull          Pec stretch          Post deltoid           Timed Code Treatment Minutes:  60    Total Treatment Minutes:  60    Treatment/Activity Tolerance:  [x] Patient tolerated treatment well [x] Patient limited by fatique  [] Patient limited by pain [] Patient limited by other medical complications  [] Other:     Prognosis: [] Good [x] Fair  [] Poor    Patient Requires Follow-up: [x] Yes  [] No    Plan:   [x] Continue per plan of care [] Alter current plan (see comments)  [] Plan of care initiated [] Hold pending MD visit [] Discharge    Treatment Charges: Mins Units   Initial Evaluation     Re-Evaluation     Ther Exercise         TE     Aquatic Treatment 60 4   Ther Activities        TA     Gait Training          GT     Neuro Re-education NR     Modalities     Non-Billable Service Time     Other     Total Time/Units 60 4         Electronically signed by:  Shaka Harp PTA [id number]

## 2022-11-09 NOTE — PROGRESS NOTES
[facility]  Phone: [phone] Fax: [phone]        Physical Therapy Aquatic Flow Sheet   Date:  2022    Patient Name:  Lanie Calles    :  1980  Restrictions/Precautions:    Diagnosis:    Treatment Diagnosis:    Insurance/Certification information:  [place] / Plan: 40 [employer] DEPT OF JOB / Product Type: *No Product type* /   Insurance ID: [id number] -   Referring Physician:  Scarlet Saba MD     PCP: Suellen Kehr, DO  Plan of care signed (Y/N):    Visit# / total visits:    Pain level: 1010     Electronically signed by:  Leslie Slater PTA  Time RY:0127  Time SLZ:9138    Subjective:Outpatient PT  Comments: Patient presents to PT with soreness across R hip region s/p R LUTHER from 22. Patient reports having outpatient therapy following. Recently finished therapy however now receieved script for aquatic therapy. Patient states that gait mechanics are stable when he takes his time however tends to ambulate quickly due to working at [employer]. He ambulates with and without cane. Pt denies any falls. Pt admits to burning and numbness sensation across L thigh (opposite side). Pt reports generalized weakness across RLE. Pt takes OTC  meds for symptom reduction. Pt states hip precautions have been removed except not lifting > 20lbs.       Key  B= Belt DB= Dumbells T= Theratube   H= Hydrotone N= Noodles W= Weights   P= Paddles S= Speedo equipment K= Kickboard     Exercises/Activities   Warm-up/Amb  Minutes  Exercises  Minutes    Slow forward  5   HR/TR  5    Slow sideways  5  Marches  5    Slow backwards  5  Mini-squats  5    Medium forward    4-way SLR  5    Medium sideways    Hip circles/fig 8  5    Small shuffle    Hamstring curls  5    Jog    Knee extension  5    Braiding    Pelvic tilts  5    Bicycling  5  Scap squeezes          Shoulder flex/ext      Functional    Shoulder abd/add      Step    Shoulder H. abd/add      Lifting    Shoulder IR/ER      Hand to opp knee    Rowing      Push down squat    Bilateral pull down      UE PNF    Push/pull      LE PNF    Push downs      Wall push ups    Arm circles      SLS    Elbow flex/ext          Chin tuck      Stretching    UT shrugs/rolls      Gastroc/Soleus    Rocking horse      Hamstring          SKTC    Other      Piriformis          Hip flexor          Ladder pull          Pec stretch          Post deltoid           Timed Code Treatment Minutes:  60    Total Treatment Minutes:  60    Treatment/Activity Tolerance:  [x] Patient tolerated treatment well [x] Patient limited by fatique  [] Patient limited by pain [] Patient limited by other medical complications  [] Other:     Prognosis: [] Good [x] Fair  [] Poor    Patient Requires Follow-up: [x] Yes  [] No    Plan:   [x] Continue per plan of care [] Alter current plan (see comments)  [] Plan of care initiated [] Hold pending MD visit [] Discharge    Treatment Charges: Mins Units   Initial Evaluation     Re-Evaluation     Ther Exercise         TE     Aquatic Treatment 60 4   Ther Activities        TA     Gait Training          GT     Neuro Re-education NR     Modalities     Non-Billable Service Time     Other     Total Time/Units 60 4         Electronically signed by:  Tom Lucas PTA [id number]

## 2022-11-09 NOTE — PROGRESS NOTES
[facility]  Phone: [phone] Fax: [phone]        Physical Therapy Aquatic Flow Sheet   Date:  10/10/2022    Patient Name:  Thelma Kraft    :  1980  Restrictions/Precautions:    Diagnosis:    Treatment Diagnosis:    Insurance/Certification information:  [place] / Plan: [address] [facility] [employer] DEPT OF JOB / Product Type: *No Product type* /   Insurance ID: [id number] -   Referring Physician:  Anthony Jimenez MD     PCP: Liborio Spann DO  Plan of care signed (Y/N):    Visit# / total visits:  3/16  Pain level: 1010     Electronically signed by:  Pablito Bunn PTA  Time XQ:9653  Time LBE:9865    Subjective:Outpatient PT  Comments: Patient presents to PT with soreness across R hip region s/p R LUTHER from 22. Patient reports having outpatient therapy following. Recently finished therapy however now receieved script for aquatic therapy. Patient states that gait mechanics are stable when he takes his time however tends to ambulate quickly due to working at [employer]. He ambulates with and without cane. Pt denies any falls. Pt admits to burning and numbness sensation across L thigh (opposite side). Pt reports generalized weakness across RLE. Pt takes OTC  meds for symptom reduction. Pt states hip precautions have been removed except not lifting > 20lbs.       Key  B= Belt DB= Dumbells T= Theratube   H= Hydrotone N= Noodles W= Weights   P= Paddles S= Speedo equipment K= Kickboard     Exercises/Activities   Warm-up/Amb  Minutes  Exercises  Minutes    Slow forward  5   HR/TR  5    Slow sideways  5  Marches  5    Slow backwards  5  Mini-squats  5    Medium forward    4-way SLR  5    Medium sideways    Hip circles/fig 8  5    Small shuffle    Hamstring curls  5    Jog    Knee extension  5    Braiding    Pelvic tilts  5    Bicycling  5  Scap squeezes          Shoulder flex/ext      Functional    Shoulder abd/add      Step    Shoulder H. abd/add      Lifting    Shoulder IR/ER      Hand to opp knee    Rowing      Push down squat    Bilateral pull down      UE PNF    Push/pull      LE PNF    Push downs      Wall push ups    Arm circles      SLS    Elbow flex/ext          Chin tuck      Stretching    UT shrugs/rolls      Gastroc/Soleus    Rocking horse      Hamstring          SKTC    Other      Piriformis          Hip flexor          Ladder pull          Pec stretch          Post deltoid           Timed Code Treatment Minutes:  60    Total Treatment Minutes:  60    Treatment/Activity Tolerance:  [x] Patient tolerated treatment well [x] Patient limited by fatique  [] Patient limited by pain [] Patient limited by other medical complications  [] Other:     Prognosis: [] Good [x] Fair  [] Poor    Patient Requires Follow-up: [x] Yes  [] No    Plan:   [x] Continue per plan of care [] Alter current plan (see comments)  [] Plan of care initiated [] Hold pending MD visit [] Discharge    Treatment Charges: Mins Units   Initial Evaluation     Re-Evaluation     Ther Exercise         TE     Aquatic Treatment 60 4   Ther Activities        TA     Gait Training          GT     Neuro Re-education NR     Modalities     Non-Billable Service Time     Other     Total Time/Units 60 4         Electronically signed by:  Sukhi Schroeder PTA [id number]

## 2022-11-14 ENCOUNTER — HOSPITAL ENCOUNTER (OUTPATIENT)
Dept: PHYSICAL THERAPY | Age: 42
Setting detail: THERAPIES SERIES
End: 2022-11-14
Payer: COMMERCIAL

## 2022-11-16 ENCOUNTER — HOSPITAL ENCOUNTER (OUTPATIENT)
Dept: PHYSICAL THERAPY | Age: 42
Setting detail: THERAPIES SERIES
Discharge: HOME | End: 2022-11-16
Payer: MEDICAID

## 2022-11-28 ENCOUNTER — HOSPITAL ENCOUNTER (OUTPATIENT)
Dept: PHYSICAL THERAPY | Age: 42
Setting detail: THERAPIES SERIES
Discharge: HOME | End: 2022-11-28
Payer: COMMERCIAL

## 2022-11-28 NOTE — PROGRESS NOTES
[facility]  Phone: [phone] Fax: [phone]        Physical Therapy Aquatic Flow Sheet   Date: 10/31/2022  Date:  2022    Patient Name:  Norma Alonso    :  1980  Restrictions/Precautions:    Diagnosis:    Treatment Diagnosis:    Insurance/Certification information:  [place] / Plan: 40 [facility] DEPT OF JOB / Product Type: *No Product type* /   Insurance ID: [id number] -   Referring Physician:  Kami Thurman MD     PCP: Chelsea North DO  Plan of care signed (Y/N):    Visit# / total visits:    Pain level: 5/-better-10     Electronically signed by:  Hernan Ingram PTA  Time GN:6732  Time AKIKO:5758      Key  B= Belt DB= Dumbells T= Theratube   H= Hydrotone N= Noodles W= Weights   P= Paddles S= Speedo equipment K= Kickboard     Exercises/Activities   Warm-up/Amb  Minutes  Exercises  Minutes    Slow forward  5  HR/TR  5    Slow sideways  5  Marches  5    Slow backwards  5  Mini-squats  5    Medium forward   Forward and back ruiz kick  5    Medium sideways    Hip abduction  5    Small shuffle    Hamstring curls  5    Jog    Knee extension  5    Braiding    Pelvic tilts  5    Bicycling  5  Scap squeezes          Shoulder flex/ext      Functional    Shoulder abd/add      Step    Shoulder H. abd/add      Lifting    Shoulder IR/ER      Hand to opp knee    Rowing      Push down squat    Bilateral pull down      UE PNF    Push/pull      LE PNF    Push downs      Wall push ups    Arm circles      SLS    Elbow flex/ext          Chin tuck      Stretching    UT shrugs/rolls      Gastroc/Soleus    Rocking horse      Hamstring          SKTC    Other      Piriformis          Hip flexor          Ladder pull          Pec stretch          Post deltoid           Timed Code Treatment Minutes:  60    Total Treatment Minutes:  60    Treatment/Activity Tolerance:  [] Patient tolerated treatment well [x] Patient limited by fatique  [x] Patient limited by pain [] Patient limited by other medical complications  [x] Other: modify TE -decrease intensity this visit due to back and hip flare up    Prognosis: [] Good [x] Fair  [] Poor    Patient Requires Follow-up: [x] Yes  [] No    Plan:   [x] Continue per plan of care [] Alter current plan (see comments)  [] Plan of care initiated [] Hold pending MD visit [] Discharge    Treatment Charges: Mins Units   Initial Evaluation     Re-Evaluation     Ther Exercise         TE     Aquatic Treatment 60 4   Ther Activities        TA     Gait Training          GT     Neuro Re-education NR     Modalities     Non-Billable Service Time     Other     Total Time/Units 60 4         Electronically signed by:  Filippo Moya PTA [id number]

## 2022-12-02 NOTE — PROGRESS NOTES
[facility]  Phone: [phone] Fax: [phone]        Physical Therapy Aquatic Flow Sheet   Date: 2022  Date:  2022    Patient Name:  Ramses Gayle    :  1980  Restrictions/Precautions:    Diagnosis:    Treatment Diagnosis:    Insurance/Certification information:  [place] / Plan: 40 [facility] DEPT OF JOB / Product Type: *No Product type* /   Insurance ID: [id number] -   Referring Physician:  Tangela Carr MD     PCP: Thania Pastor DO  Plan of care signed (Y/N):    Visit# / total visits:   Pain level: 5/-better-10     Electronically signed by:  Lee Ann Hughes PTA  Time TD:3176  Time SJZ:9055      Key  B= Belt DB= Dumbells T= Theratube   H= Hydrotone N= Noodles W= Weights   P= Paddles S= Speedo equipment K= Kickboard     Exercises/Activities   Warm-up/Amb  Minutes  Exercises  Minutes    Slow forward  5  HR/TR  5    Slow sideways  5  Marches  5    Slow backwards  5  Mini-squats  5    Medium forward   Forward and back ruiz kick  5    Medium sideways    Hip abduction  5    Small shuffle    Hamstring curls  5    Jog    Knee extension  5    Braiding    Pelvic tilts  5    Bicycling  5  Scap squeezes          Shoulder flex/ext      Functional    Shoulder abd/add      Step    Shoulder H. abd/add      Lifting    Shoulder IR/ER      Hand to opp knee    Rowing      Push down squat    Bilateral pull down      UE PNF    Push/pull      LE PNF    Push downs      Wall push ups    Arm circles      SLS    Elbow flex/ext          Chin tuck      Stretching    UT shrugs/rolls      Gastroc/Soleus    Rocking horse      Hamstring          SKTC    Other      Piriformis          Hip flexor          Ladder pull          Pec stretch          Post deltoid           Timed Code Treatment Minutes:  60    Total Treatment Minutes:  60    Treatment/Activity Tolerance:  [] Patient tolerated treatment well [x] Patient limited by fatique  [x] Patient limited by pain [] Patient limited by other medical complications  [x] Other: continue modify TE -decrease intensity this visit due to back and hip flare up  Quality of Gait: Patient ambulates without AD. Patient demonstrates R lateral deviation/hip drop; limited heel-toe mechanics throughout  Gait Deviations: Slow Carey, Decreased step length, Decreased step height  Comments: PT did have pt ambulate with SC. Pt demonstrated significant improvement in gait with heel-toe mechanics with little to no deviation- Pt encouraged to use cane at home periodically to improve functional gait mechanics  Prognosis: [] Good [x] Fair  [] Poor    Patient Requires Follow-up: [x] Yes  [] No    Plan: Aquatic therapy to Increase functional mobility ; Increase ROM; Increase strength; Increase endurance; Improve posture;Decrease pain   [x] Continue per plan of care [] Alter current plan (see comments)  [] Plan of care initiated [] Hold pending MD visit [] Discharge    Treatment Charges: Mins Units   Initial Evaluation     Re-Evaluation     Ther Exercise         TE     Aquatic Treatment 60 4   Ther Activities        TA     Gait Training          GT     Neuro Re-education NR     Modalities     Non-Billable Service Time     Other     Total Time/Units 60 4         Electronically signed by:  Romulo Cowan PTA [id number]

## 2022-12-03 NOTE — PROGRESS NOTES
[facility]  Phone: [phone] Fax: [phone]        Physical Therapy Aquatic Flow Sheet     Date:  2022  Date:  12/3/2022    Patient Name:  Hardik Vaz    :  1980  Restrictions/Precautions:    Diagnosis:    Treatment Diagnosis:    Insurance/Certification information:  [place] / Plan: 40 [facility] DEPT OF JOB / Product Type: *No Product type* /   Insurance ID: [id number] -   Referring Physician:  Sherrie Holland MD     PCP: Hillary Thapa DO  Pain level: 5/10     Electronically signed by:  Lacey Anderson PTA  Time OJ:4351  Time VZF:0581      Key  B= Belt DB= Dumbells T= Theratube   H= Hydrotone N= Noodles W= Weights   P= Paddles S= Speedo equipment K= Kickboard     Exercises/Activities   Warm-up/Amb  Minutes  Exercises  Minutes    Slow forward  5 +DBs  HR/TR  5    Slow sideways  5 +BP  Marches  5    Slow backwards  5 +DB  Mini-squats  5    Medium forward   Forward and back ruiz kick  5    Medium sideways    Hip abduction  5    Small shuffle    Hamstring curls  5    Jog    Knee extension  5    Braiding    Pelvic tilts  5    Bicycling  5  Scap squeezes          Shoulder flex/ext      Functional    Shoulder abd/add      Step    Shoulder H. abd/add      Lifting    Shoulder IR/ER      Hand to opp knee    Rowing      Push down squat    Bilateral pull down      UE PNF    Push/pull      LE PNF    Push downs      Wall push ups    Arm circles      SLS    Elbow flex/ext          Chin tuck      Stretching    UT shrugs/rolls      Gastroc/Soleus    Rocking horse      Hamstring          SKTC    Other      Piriformis          Hip flexor          Ladder pull          Pec stretch          Post deltoid           Timed Code Treatment Minutes:  60    Total Treatment Minutes:  60    Treatment/Activity Tolerance:  [x] Patient tolerated treatment well [] Patient limited by fatique  [] Patient limited by pain [] Patient limited by other medical complications  [x] Other: Pt's balance, confidence and strength improving in pool.      Prognosis: [] Good [x] Fair  [] Poor    Patient Requires Follow-up: [x] Yes  [] No    Plan:   [x] Continue per plan of care [] Alter current plan (see comments)  [] Plan of care initiated [] Hold pending MD visit [] Discharge    Treatment Charges: Mins Units   Initial Evaluation     Re-Evaluation     Ther Exercise         TE     Aquatic Treatment 60 4   Ther Activities        TA     Gait Training          GT     Neuro Re-education NR     Modalities     Non-Billable Service Time     Other     Total Time/Units 60 4         Electronically signed by:  Regi Parson PTA [id number]

## 2022-12-05 ENCOUNTER — APPOINTMENT (OUTPATIENT)
Dept: PHYSICAL THERAPY | Age: 42
End: 2022-12-05
Payer: COMMERCIAL

## 2022-12-07 ENCOUNTER — APPOINTMENT (OUTPATIENT)
Dept: PHYSICAL THERAPY | Age: 42
End: 2022-12-07
Payer: COMMERCIAL

## 2022-12-12 ENCOUNTER — HOSPITAL ENCOUNTER (OUTPATIENT)
Dept: PHYSICAL THERAPY | Age: 42
Setting detail: THERAPIES SERIES
Discharge: HOME | End: 2022-12-12
Payer: COMMERCIAL

## 2022-12-12 PROCEDURE — 97113 AQUATIC THERAPY/EXERCISES: CPT

## 2022-12-12 NOTE — PROGRESS NOTES
[facility]  Phone: [phone] Fax: [phone]        Physical Therapy Aquatic Flow Sheet     Date:  2022  Date:  2022    Patient Name:  Thelma Kraft    :  1980  Restrictions/Precautions:    Diagnosis:    Treatment Diagnosis:    Insurance/Certification information:  [place] / Plan: 40 [facility] DEPT OF JOB / Product Type: *No Product type* /   Insurance ID: [id number] -   Referring Physician:  Anthony Jimenez MD     PCP: Liborio Spann DO  Pain level: 5/10     Electronically signed by:  Pablito Bunn, PTA  Time RAYNA:  Time YKD:8176      Key  B= Belt DB= Dumbells T= Theratube   H= Hydrotone N= Noodles W= Weights   P= Paddles S= Speedo equipment K= Kickboard     Exercises/Activities   Warm-up/Amb  Minutes  Exercises  Minutes    Slow forward  5   HR/TR  5    Slow sideways  5  Marches  5    Slow backwards  5  Mini-squats  5    Medium forward   Forward and back ruiz kick  5    Medium sideways    Hip abduction  5    Small shuffle    Hamstring curls  5    Jog    Knee extension  5    Braiding    Pelvic tilts  5    Bicycling  5  Scap squeezes          Shoulder flex/ext      Functional    Shoulder abd/add      Step    Shoulder H. abd/add      Lifting    Shoulder IR/ER      Hand to opp knee    Rowing      Push down squat    Bilateral pull down      UE PNF    Push/pull      LE PNF    Push downs      Wall push ups    Arm circles      SLS    Elbow flex/ext          Chin tuck      Stretching    UT shrugs/rolls      Gastroc/Soleus    Rocking horse      Hamstring          SKTC    Other      Piriformis          Hip flexor          Ladder pull          Pec stretch          Post deltoid           Timed Code Treatment Minutes:  60    Total Treatment Minutes:  60    Treatment/Activity Tolerance:  [x] Patient tolerated treatment well [] Patient limited by fatique  [] Patient limited by pain [] Patient limited by other medical complications  [x] Other: Gait mechanics:better-flowing Carey, Increase step length, Increase step height    Pt's balance, confidence and strength improving in pool.      Prognosis: [] Good [x] Fair  [] Poor    Patient Requires Follow-up: [x] Yes  [] No    Plan:   [x] Continue per plan of care [] Alter current plan (see comments)  [] Plan of care initiated [] Hold pending MD visit [] Discharge    Treatment Charges: Mins Units   Initial Evaluation     Re-Evaluation     Ther Exercise         TE     Aquatic Treatment 60 4   Ther Activities        TA     Gait Training          GT     Neuro Re-education NR     Modalities     Non-Billable Service Time     Other     Total Time/Units 60 4         Electronically signed by:  Shelbi Ontiveros PTA [id number]

## 2022-12-14 ENCOUNTER — HOSPITAL ENCOUNTER (OUTPATIENT)
Dept: PHYSICAL THERAPY | Age: 42
Setting detail: THERAPIES SERIES
Discharge: HOME | End: 2022-12-14
Payer: COMMERCIAL

## 2022-12-18 NOTE — PROGRESS NOTES
[facility]  Phone: [phone] Fax: [phone]        Physical Therapy Aquatic Flow Sheet     Date:    Date:  2022    Patient Name:  Lulú Banda    :  1980  Restrictions/Precautions:    Diagnosis:    Treatment Diagnosis:    Insurance/Certification information:  [place] / Plan: 40 [employer] DEPT OF JOB / Product Type: *No Product type* /   Insurance ID: [id number] -   Referring Physician:  Stacey Alejandro MD     PCP: Sander Witt DO  Pain level: 5/10     Electronically signed by:  Jen Turcios PTA  Time LA:  Time NEQ:5995      Key  B= Belt DB= Dumbells T= Theratube   H= Hydrotone N= Noodles W= Weights   P= Paddles S= Speedo equipment K= Kickboard     Exercises/Activities   Warm-up/Amb  Minutes  Exercises  Minutes    Slow forward  5   HR/TR  5    Slow sideways  5  Marches  5    Slow backwards  5  Mini-squats  5    Medium forward   Forward and back ruiz kick  5    Medium sideways    Hip abduction  5    Small shuffle    Hamstring curls  5    Jog    Knee extension  5    Braiding    Pelvic tilts  5    Bicycling  5  Scap squeezes          Shoulder flex/ext      Functional    Shoulder abd/add      Step    Shoulder H. abd/add      Lifting    Shoulder IR/ER      Hand to opp knee    Rowing      Push down squat    Bilateral pull down      UE PNF    Push/pull      LE PNF    Push downs      Wall push ups    Arm circles      SLS    Elbow flex/ext          Chin tuck      Stretching    UT shrugs/rolls      Gastroc/Soleus    Rocking horse      Hamstring          SKTC    Other      Piriformis          Hip flexor          Ladder pull          Pec stretch          Post deltoid           Timed Code Treatment Minutes:  60    Total Treatment Minutes:  60    Treatment/Activity Tolerance:  [x] Patient tolerated treatment well [] Patient limited by fatique  [] Patient limited by pain [] Patient limited by other medical complications  [x] Other: LTG: ACHIEVED:  increase strength of R hip to grossly 4+ to 5/5 allowing for proper heel-toe mechanics without trunk deviation MET   decrease pain to < 3/10 across R hip with activity MET   pt demonstrates independence with HEP MET  Pt independently working out at [employer] as to maintain strength and endurance. Pt adheres to [id number] protocol=                  Gait mechanics:better-flowing Carey, Increase step length, Increase step height    Pt's balance, confidence and strength improving in pool.      Prognosis: [] Good [x] Fair  [] Poor    Patient Requires Follow-up: [x] Yes  [] No    Plan:   [x] Continue per plan of care [] Alter current plan (see comments)  [] Plan of care initiated [] Hold pending MD visit [] Discharge    Treatment Charges: Mins Units   Initial Evaluation     Re-Evaluation     Ther Exercise         TE     Aquatic Treatment 60 4   Ther Activities        TA     Gait Training          GT     Neuro Re-education NR     Modalities     Non-Billable Service Time     Other     Total Time/Units 60 4         Electronically signed by:  Sukhi Schroeder PTA [id number]

## 2022-12-19 ENCOUNTER — HOSPITAL ENCOUNTER (OUTPATIENT)
Dept: PHYSICAL THERAPY | Age: 42
Setting detail: THERAPIES SERIES
Discharge: HOME | End: 2022-12-19
Payer: COMMERCIAL

## 2022-12-21 ENCOUNTER — HOSPITAL ENCOUNTER (OUTPATIENT)
Dept: PHYSICAL THERAPY | Age: 42
Setting detail: THERAPIES SERIES
Discharge: HOME | End: 2022-12-21
Payer: COMMERCIAL

## 2022-12-27 NOTE — PROGRESS NOTES
[facility]  Phone: [phone] Fax: [phone]        Physical Therapy Aquatic Flow Sheet     Date:  2022  Date:  2022    Patient Name:  Francisco Joshi    :  1980  Restrictions/Precautions:    Diagnosis:    Treatment Diagnosis:    Insurance/Certification information:  [place] / Plan: 40 [employer] DEPT OF JOB / Product Type: *No Product type* /   Insurance ID: [id number] -   Referring Physician:  Jennifer Lozoya MD     PCP: Masoud Das DO  Pain level: 5/10     Electronically signed by:  Danielle Mccann PTA  Time SIMS:0139  Time GLS:3057      Key  B= Belt DB= Dumbells T= Theratube   H= Hydrotone N= Noodles W= Weights   P= Paddles S= Speedo equipment K= Kickboard     Exercises/Activities   Warm-up/Amb  Minutes  Exercises  Minutes    Slow forward  5   HR/TR  5    Slow sideways  5  Marches  5    Slow backwards  5  Mini-squats  5    Medium forward   Forward and back ruiz kick  5    Medium sideways    Hip abduction  5    Small shuffle    Hamstring curls  5    Jog    Knee extension  5    Braiding    Pelvic tilts  5    Bicycling  5  Scap squeezes          Shoulder flex/ext      Functional    Shoulder abd/add      Step    Shoulder H. abd/add      Lifting    Shoulder IR/ER      Hand to opp knee    Rowing      Push down squat    Bilateral pull down      UE PNF    Push/pull      LE PNF    Push downs      Wall push ups    Arm circles      SLS    Elbow flex/ext          Chin tuck      Stretching    UT shrugs/rolls      Gastroc/Soleus    Rocking horse      Hamstring          SKTC    Other      Piriformis          Hip flexor          Ladder pull          Pec stretch          Post deltoid           Timed Code Treatment Minutes:  60    Total Treatment Minutes:  60    Treatment/Activity Tolerance:  [x] Patient tolerated treatment well [] Patient limited by fatique  [] Patient limited by pain [] Patient limited by other medical complications  [x] Other: LTG: ACHIEVED:  increase strength of R hip to grossly 4+ to 5/5 allowing for proper heel-toe mechanics without trunk deviation MET   decrease pain to < 3/10 across R hip with activity MET   pt demonstrates independence with HEP MET  Pt independently working out at [employer] as to maintain strength and endurance. Pt adheres to [facility] protocol=                  Gait mechanics:better-flowing Carey, Increase step length, Increase step height    Pt's balance, confidence and strength improving in pool.      Prognosis: [] Good [x] Fair  [] Poor    Patient Requires Follow-up: [x] Yes  [] No    Plan:   [x] Continue per plan of care [] Alter current plan (see comments)  [] Plan of care initiated [] Hold pending MD visit [] Discharge    Treatment Charges: Mins Units   Initial Evaluation     Re-Evaluation     Ther Exercise         TE     Aquatic Treatment 60 4   Ther Activities        TA     Gait Training          GT     Neuro Re-education NR     Modalities     Non-Billable Service Time     Other     Total Time/Units 60 4         Electronically signed by:  Kai Aj PTA [id number]

## 2023-01-04 NOTE — PROGRESS NOTES
[facility]  Phone: [phone] Fax: [phone]        Physical Therapy Aquatic Flow Sheet     Date:  2022  Date:  2023    Patient Name:  Ella Cox    :  1980  Restrictions/Precautions:    Diagnosis:    Treatment Diagnosis:    Insurance/Certification information:  [place] / Plan: 40 [employer] DEPT OF JOB / Product Type: *No Product type* /   Insurance ID: [id number] -   Referring Physician:  Issa Aden MD     PCP: Elizabeth Mei DO  Pain level: 5/10     Electronically signed by:  Mann Flores PTA  Time UV:6760  Time CWV:0193      Key  B= Belt DB= Dumbells T= Theratube   H= Hydrotone N= Noodles W= Weights   P= Paddles S= Speedo equipment K= Kickboard     Exercises/Activities   Warm-up/Amb  Minutes  Exercises  Minutes    Slow forward  5   HR/TR  5    Slow sideways  5  Marches  5    Slow backwards  5  Mini-squats  5    Medium forward   Forward and back ruiz kick  5    Medium sideways    Hip abduction  5    Small shuffle    Hamstring curls  5    Jog    Knee extension  5    Braiding    Pelvic tilts  5    Bicycling  5  Scap squeezes          Shoulder flex/ext      Functional    Shoulder abd/add      Step    Shoulder H. abd/add      Lifting    Shoulder IR/ER      Hand to opp knee    Rowing      Push down squat    Bilateral pull down      UE PNF    Push/pull      LE PNF    Push downs      Wall push ups    Arm circles      SLS    Elbow flex/ext          Chin tuck      Stretching    UT shrugs/rolls      Gastroc/Soleus    Rocking horse      Hamstring          SKTC    Other      Piriformis          Hip flexor          Ladder pull          Pec stretch          Post deltoid           Timed Code Treatment Minutes:  60    Total Treatment Minutes:  60    Treatment/Activity Tolerance:  [x] Patient tolerated treatment well [] Patient limited by fatique  [] Patient limited by pain [] Patient limited by other medical complications  [x] Other: LTG: ACHIEVED:  increase strength of R hip to grossly 4+ to 5/5 allowing for proper heel-toe mechanics without trunk deviation MET   decrease pain to < 3/10 across R hip with activity MET   pt demonstrates independence with HEP MET  Pt independently working out at [employer] as to maintain strength and endurance. Pt adheres to [facility] protocol=                  Gait mechanics:better-flowing Carey, Increase step length, Increase step height    Pt's balance, confidence and strength improving in pool.      Prognosis: [] Good [x] Fair  [] Poor    Patient Requires Follow-up: [x] Yes  [] No    Plan:   [x] Continue per plan of care [] Alter current plan (see comments)  [] Plan of care initiated [] Hold pending MD visit [] Discharge    Treatment Charges: Mins Units   Initial Evaluation     Re-Evaluation     Ther Exercise         TE     Aquatic Treatment 60 4   Ther Activities        TA     Gait Training          GT     Neuro Re-education NR     Modalities     Non-Billable Service Time     Other     Total Time/Units 60 4         Electronically signed by:  Lul Arnold PTA [id number]

## 2023-01-07 NOTE — PROGRESS NOTES
[facility]  Phone: [phone] Fax: [phone]        Physical Therapy Aquatic Flow Sheet     Date:  2022  Date:  2023    Patient Name:  Carmen Robb    :  1980  Restrictions/Precautions:    Diagnosis:    Treatment Diagnosis:    Insurance/Certification information:  MED[place] / Plan: [employer] DEPT OF JOB / Product Type: *No Product type* /   Insurance ID: [id number] -   Referring Physician:  Anisa Pablo MD     PCP: Ace Ibarra DO  Pain level: 5/10     Electronically signed by:  Shaka Harp PTA  Time PU:5141  Time TJQ:0176  Visit #     Key  B= Belt DB= Dumbells T= Theratube   H= Hydrotone N= Noodles W= Weights   P= Paddles S= Speedo equipment K= Kickboard     Exercises/Activities   Warm-up/Amb  Minutes  Exercises  Minutes    Slow forward  5   HR/TR  5    Slow sideways  5  Marches  5    Slow backwards  5  Mini-squats  5    Medium forward   Forward and back ruiz kick  5    Medium sideways    Hip abduction  5    Small shuffle    Hamstring curls  5    Jog    Knee extension  5    Braiding    Pelvic tilts  5    Bicycling  5  Scap squeezes          Shoulder flex/ext      Functional    Shoulder abd/add      Step    Shoulder H. abd/add      Lifting    Shoulder IR/ER      Hand to opp knee    Rowing      Push down squat    Bilateral pull down      UE PNF    Push/pull      LE PNF    Push downs      Wall push ups    Arm circles      SLS    Elbow flex/ext          Chin tuck      Stretching    UT shrugs/rolls      Gastroc/Soleus    Rocking horse      Hamstring          SKTC    Other      Piriformis          Hip flexor          Ladder pull          Pec stretch          Post deltoid           Timed Code Treatment Minutes:  60    Total Treatment Minutes:  60    Treatment/Activity Tolerance:  [x] Patient tolerated treatment well [] Patient limited by fatique  [] Patient limited by pain [] Patient limited by other medical complications  [x] Other: LTG: ACHIEVED:  increase strength of R hip to grossly 4+ to 5/5 allowing for proper heel-toe mechanics without trunk deviation MET   decrease pain to < 3/10 across R hip with activity MET   pt demonstrates independence with HEP MET  Pt independently working out at [employer] as to maintain strength and endurance. Pt adheres to [facility] protocol=                  Gait mechanics:better-flowing Carey, Increase step length, Increase step height    Pt's balance, confidence and strength improving in pool.        Plan:    [x] Discharge    Treatment Charges: Mins Units   Initial Evaluation     Re-Evaluation     Ther Exercise         TE     Aquatic Treatment 60 4   Ther Activities        TA     Gait Training          GT     Neuro Re-education NR     Modalities     Non-Billable Service Time     Other     Total Time/Units 60 4         Electronically signed by:  Jesus Sanchez PTA [id number]

## 2023-04-27 ASSESSMENT — HOOS JR
GOING UP OR DOWN STAIRS: 1
HOOS JR TOTAL INTERVAL SCORE: 80.55
HOOS JR RAW SCORE: 3
WALKING ON UNEVEN SURFACE: 1
SITTING: 0
HOOS JR RAW SCORE: 3
RISING FROM SITTING: 0
LYING IN BED (TURNING OVER, MAINTAINING HIP POSITION): 0
BENDING TO THE FLOOR TO PICK UP OBJECT: 1

## 2023-04-27 ASSESSMENT — PROMIS GLOBAL HEALTH SCALE
IN GENERAL, HOW WOULD YOU RATE YOUR MENTAL HEALTH, INCLUDING YOUR MOOD AND YOUR ABILITY TO THINK [ON A SCALE OF 1 (POOR) TO 5 (EXCELLENT)]?: 5
IN GENERAL, WOULD YOU SAY YOUR QUALITY OF LIFE IS...[ON A SCALE OF 1 (POOR) TO 5 (EXCELLENT)]: 4
HOW IS THE PROMIS V1.1 BEING ADMINISTERED?: 2
WHO IS THE PERSON COMPLETING THE PROMIS V1.1 SURVEY?: 0
IN THE PAST 7 DAYS, HOW OFTEN HAVE YOU BEEN BOTHERED BY EMOTIONAL PROBLEMS, SUCH AS FEELING ANXIOUS, DEPRESSED, OR IRRITABLE [ON A SCALE FROM 1 (NEVER) TO 5 (ALWAYS)]?: 5
TO WHAT EXTENT ARE YOU ABLE TO CARRY OUT YOUR EVERYDAY PHYSICAL ACTIVITIES SUCH AS WALKING, CLIMBING STAIRS, CARRYING GROCERIES, OR MOVING A CHAIR [ON A SCALE OF 1 (NOT AT ALL) TO 5 (COMPLETELY)]?: 5
IN GENERAL, PLEASE RATE HOW WELL YOU CARRY OUT YOUR USUAL SOCIAL ACTIVITIES (INCLUDES ACTIVITIES AT HOME, AT WORK, AND IN YOUR COMMUNITY, AND RESPONSIBILITIES AS A PARENT, CHILD, SPOUSE, EMPLOYEE, FRIEND, ETC) [ON A SCALE OF 1 (POOR) TO 5 (EXCELLENT)]?: 5
IN THE PAST 7 DAYS, HOW WOULD YOU RATE YOUR PAIN ON AVERAGE [ON A SCALE FROM 0 (NO PAIN) TO 10 (WORST IMAGINABLE PAIN)]?: 3
IN GENERAL, HOW WOULD YOU RATE YOUR SATISFACTION WITH YOUR SOCIAL ACTIVITIES AND RELATIONSHIPS [ON A SCALE OF 1 (POOR) TO 5 (EXCELLENT)]?: 4
IN GENERAL, WOULD YOU SAY YOUR HEALTH IS...[ON A SCALE OF 1 (POOR) TO 5 (EXCELLENT)]: 3
IN THE PAST 7 DAYS, HOW WOULD YOU RATE YOUR FATIGUE ON AVERAGE [ON A SCALE FROM 1 (NONE) TO 5 (VERY SEVERE)]?: 5
SUM OF RESPONSES TO QUESTIONS 2, 4, 5, & 10: 18
SUM OF RESPONSES TO QUESTIONS 3, 6, 7, & 8: 15
IN GENERAL, HOW WOULD YOU RATE YOUR PHYSICAL HEALTH [ON A SCALE OF 1 (POOR) TO 5 (EXCELLENT)]?: 2

## 2024-10-23 NOTE — PROGRESS NOTES
APPT. 2/6/25, LOV. 8/6/24.   [facility]  Phone: [phone] Fax: [phone]        Physical Therapy Aquatic Flow Sheet   Date:  10/26/2022  Date:  12/3/2022    Patient Name:  Alyse Klinefelter    :  1980  Restrictions/Precautions:    Diagnosis:    Treatment Diagnosis:    Insurance/Certification information:  [place] / Plan: [employer] DEPT OF JOB / Product Type: *No Product type* /   Insurance ID: [id number] -   Referring Physician:  Matheus Bernal MD     PCP: Haley Shannon DO  Plan of care signed (Y/N):    Visit# / total visits:    Pain level: Less 6/10     Electronically signed by:  Farshad Marshall PTA  Time UN:3864  Time ROSENDO:2785      Key  B= Belt DB= Dumbells T= Theratube   H= Hydrotone N= Noodles W= Weights   P= Paddles S= Speedo equipment K= Kickboard     Exercises/Activities   Warm-up/Amb  Minutes  Exercises  Minutes    Slow forward  5 +DBs  HR/TR  5    Slow sideways  5 +BP  Marches  5    Slow backwards  5 +DB  Mini-squats  5    Medium forward   Forward and back ruiz kick  5    Medium sideways    Hip abduction  5    Small shuffle    Hamstring curls  5    Jog    Knee extension  5    Braiding    Pelvic tilts  5    Bicycling  5  Scap squeezes          Shoulder flex/ext      Functional    Shoulder abd/add      Step    Shoulder H. abd/add      Lifting    Shoulder IR/ER      Hand to opp knee    Rowing      Push down squat    Bilateral pull down      UE PNF    Push/pull      LE PNF    Push downs      Wall push ups    Arm circles      SLS    Elbow flex/ext          Chin tuck      Stretching    UT shrugs/rolls      Gastroc/Soleus    Rocking horse      Hamstring          SKTC    Other      Piriformis          Hip flexor          Ladder pull          Pec stretch          Post deltoid           Timed Code Treatment Minutes:  60    Total Treatment Minutes:  60    Treatment/Activity Tolerance:  [x] Patient tolerated treatment well [x] Patient limited by fatique  [] Patient limited by pain [] Patient limited by other medical complications  [] Other:     Prognosis: [] Good [x] Fair  [] Poor    Patient Requires Follow-up: [x] Yes  [] No    Plan:   [x] Continue per plan of care [] Alter current plan (see comments)  [] Plan of care initiated [] Hold pending MD visit [] Discharge    Treatment Charges: Mins Units   Initial Evaluation     Re-Evaluation     Ther Exercise         TE     Aquatic Treatment 60 4   Ther Activities        TA     Gait Training          GT     Neuro Re-education NR     Modalities     Non-Billable Service Time     Other     Total Time/Units 60 4         Electronically signed by:  Fredrick Levine PTA [id number]

## (undated) DEVICE — TUBE IRRIG HNDPC HI FLO TP INTRPULS W/SUCTION TUBE

## (undated) DEVICE — STRIP,CLOSURE,WOUND,MEDI-STRIP,1/2X4: Brand: MEDLINE

## (undated) DEVICE — INSTRUMENT CORE REAMERS STRYKER REUSABLE

## (undated) DEVICE — GLOVE SURG SZ 8 L12IN FNGR THK79MIL GRN LTX FREE

## (undated) DEVICE — DOUBLE BASIN SET: Brand: MEDLINE INDUSTRIES, INC.

## (undated) DEVICE — SOLUTION IRRIG 3000ML 0.9% SOD CHL USP UROMATIC PLAS CONT

## (undated) DEVICE — BLADE ES L6IN ELASTOMERIC COAT EXT DURABLE BEND UPTO 90DEG

## (undated) DEVICE — SPONGE LAP W18XL18IN WHT COT 4 PLY FLD STRUNG RADPQ DISP ST

## (undated) DEVICE — Device

## (undated) DEVICE — STANDARD HYPODERMIC NEEDLE,POLYPROPYLENE HUB: Brand: MONOJECT

## (undated) DEVICE — KIT DRP FOR RIO ROBOTIC ARM ASST SYS

## (undated) DEVICE — PACK PROCEDURE SURG GEN CUST

## (undated) DEVICE — PIN BNE FIX TEMP L140MM DIA4MM MAKO

## (undated) DEVICE — SOLUTION IV 500ML 0.9% SOD CHL PH 5 INJ USP VIAFLX PLAS

## (undated) DEVICE — MARKER RAD 3.5MM HEX CKPT STEREOTAXIC IMAG LESION LOC FOR

## (undated) DEVICE — ELECTRODE PT RET AD L9FT HI MOIST COND ADH HYDRGEL CORDED

## (undated) DEVICE — TRAY STRYKER MAKO TOTAL HIP ARRAY

## (undated) DEVICE — KIT SURG PREP POVIDONE IOD PRESATURATED PAINT WET FOR UNIV

## (undated) DEVICE — INSTRUMENT CURRETTES REUSABLE

## (undated) DEVICE — SYRINGE MED 30ML STD CLR PLAS LUERLOCK TIP N CTRL DISP

## (undated) DEVICE — KIT SURG W7XL11IN 2 PKT UNTREATED NA

## (undated) DEVICE — GOWN,SIRUS,FABRNF,XL,20/CS: Brand: MEDLINE

## (undated) DEVICE — INSTRUMENT SYSTEM 7 BATTERY REUSABLE

## (undated) DEVICE — DECANTER: Brand: UNBRANDED

## (undated) DEVICE — TRAY ORTHO1 REUSABLE

## (undated) DEVICE — STERILE PVP: Brand: MEDLINE INDUSTRIES, INC.

## (undated) DEVICE — HEWSON SUTURE RETRIEVER: Brand: HEWSON SUTURE RETRIEVER

## (undated) DEVICE — TRAY HIP EXTRAS REUSABLE

## (undated) DEVICE — TUBING, SUCTION, 9/32" X 10', STRAIGHT: Brand: MEDLINE

## (undated) DEVICE — ADHESIVE SKIN CLSR 0.7ML TOP DERMBND ADV

## (undated) DEVICE — GLOVE SURG SZ 8 CRM LTX FREE POLYISOPRENE POLYMER BEAD ANTI

## (undated) DEVICE — GAUZE,SPONGE,4"X4",8PLY,STRL,LF,10/TRAY: Brand: MEDLINE

## (undated) DEVICE — TOTAL HIP PK

## (undated) DEVICE — 3M™ STERI-DRAPE™ U-DRAPE 1015: Brand: STERI-DRAPE™

## (undated) DEVICE — BIT DRL L5IN DIA2.4MM STD ST S STL TWST BUSA

## (undated) DEVICE — TRAY LAMBOTS REUSABLE

## (undated) DEVICE — TRAY EXTRAS HIP JAMISON REUSABLE

## (undated) DEVICE — GLOVE ORANGE PI 8   MSG9080

## (undated) DEVICE — TRAY ORTHO2 REUSABLE

## (undated) DEVICE — TRAY ACCULADE 2 BROACH HIP TRAY  REUSABLE

## (undated) DEVICE — 3M™ IOBAN™ 2 ANTIMICROBIAL INCISE DRAPE 6651EZ: Brand: IOBAN™ 2

## (undated) DEVICE — PILLOW POS W15XH6XL22IN RASPBERRY FOAM ABD W/ STRP DISP FOR

## (undated) DEVICE — TAPE ADH W3INXL10YD WHT COT WVN BK POWERFUL RUB BASE HIGHLY

## (undated) DEVICE — 4-PORT MANIFOLD: Brand: NEPTUNE 2

## (undated) DEVICE — KIT TRK HIP PROC VIZADISC

## (undated) DEVICE — GRADUATE

## (undated) DEVICE — DRAPE,TOP,102X53,STERILE: Brand: MEDLINE

## (undated) DEVICE — SET ORTHO STD STORTSTD1

## (undated) DEVICE — 2108 SERIES SAGITTAL BLADE, OFFSET (20.0 X 0.89 X 80.0MM)

## (undated) DEVICE — 1000 S-DRAPE TOWEL DRAPE 10/BX: Brand: STERI-DRAPE™

## (undated) DEVICE — CHLORAPREP 26ML ORANGE

## (undated) DEVICE — PEEL-AWAY HOOD: Brand: FLYTE, SURGICOOL

## (undated) DEVICE — TOWEL,OR,DSP,ST,BLUE,STD,6/PK,12PK/CS: Brand: MEDLINE

## (undated) DEVICE — TRAY STRYKER MAKO UNI HIP POWER

## (undated) DEVICE — GLOVE ORTHO 8   MSG9480

## (undated) DEVICE — TRAY ACCOLADE BASIL HIP TRAY  REUSABLE

## (undated) DEVICE — BASIC SINGLE BASIN 1-LF: Brand: MEDLINE INDUSTRIES, INC.

## (undated) DEVICE — 3M™ TEGADERM™ TRANSPARENT FILM DRESSING FRAME STYLE, 1626W, 4 IN X 4-3/4 IN (10 CM X 12 CM), 50/CT 4CT/CASE: Brand: 3M™ TEGADERM™

## (undated) DEVICE — SUTURE STRATAFIX SYMMETRIC PDS + SZ 1 L18IN ABSRB VLT OS-6 SXPP1A201

## (undated) DEVICE — TRAY SYSTEM 7 DRILL REUSABLE

## (undated) DEVICE — INSTRUMENT CLAMP TOWEL LARGE REUSABLE

## (undated) DEVICE — GOWN,SIRUS,POLYRNF,BRTHSLV,XLN/XL,20/CS: Brand: MEDLINE

## (undated) DEVICE — DRAPE,REIN 53X77,STERILE: Brand: MEDLINE

## (undated) DEVICE — DRESSING HYDROFIBER AQUACEL AG ADVANTAGE 3.5X12 IN

## (undated) DEVICE — NEEDLE HYPO 22GA L1.5IN BLK POLYPR HUB S STL REG BVL STR

## (undated) DEVICE — SUTURE STRATAFIX SYMMETRIC SZ 1 L18IN ABSRB VLT CT1 L36CM SXPP1A404